# Patient Record
Sex: FEMALE | Race: WHITE | NOT HISPANIC OR LATINO | Employment: UNEMPLOYED | URBAN - METROPOLITAN AREA
[De-identification: names, ages, dates, MRNs, and addresses within clinical notes are randomized per-mention and may not be internally consistent; named-entity substitution may affect disease eponyms.]

---

## 2017-09-26 ENCOUNTER — HOSPITAL ENCOUNTER (EMERGENCY)
Facility: MEDICAL CENTER | Age: 37
End: 2017-09-27
Attending: EMERGENCY MEDICINE
Payer: MEDICAID

## 2017-09-26 ENCOUNTER — APPOINTMENT (OUTPATIENT)
Dept: RADIOLOGY | Facility: MEDICAL CENTER | Age: 37
End: 2017-09-26
Attending: EMERGENCY MEDICINE
Payer: MEDICAID

## 2017-09-26 DIAGNOSIS — Z33.2: ICD-10-CM

## 2017-09-26 DIAGNOSIS — R10.2 PELVIC PAIN: ICD-10-CM

## 2017-09-26 LAB
BASOPHILS # BLD AUTO: 0.3 % (ref 0–1.8)
BASOPHILS # BLD: 0.05 K/UL (ref 0–0.12)
EOSINOPHIL # BLD AUTO: 0.33 K/UL (ref 0–0.51)
EOSINOPHIL NFR BLD: 1.9 % (ref 0–6.9)
ERYTHROCYTE [DISTWIDTH] IN BLOOD BY AUTOMATED COUNT: 42.5 FL (ref 35.9–50)
HCT VFR BLD AUTO: 34.5 % (ref 37–47)
HGB BLD-MCNC: 11.9 G/DL (ref 12–16)
IMM GRANULOCYTES # BLD AUTO: 0.1 K/UL (ref 0–0.11)
IMM GRANULOCYTES NFR BLD AUTO: 0.6 % (ref 0–0.9)
LYMPHOCYTES # BLD AUTO: 1.8 K/UL (ref 1–4.8)
LYMPHOCYTES NFR BLD: 10.3 % (ref 22–41)
MCH RBC QN AUTO: 31.7 PG (ref 27–33)
MCHC RBC AUTO-ENTMCNC: 34.5 G/DL (ref 33.6–35)
MCV RBC AUTO: 92 FL (ref 81.4–97.8)
MONOCYTES # BLD AUTO: 0.92 K/UL (ref 0–0.85)
MONOCYTES NFR BLD AUTO: 5.2 % (ref 0–13.4)
NEUTROPHILS # BLD AUTO: 14.36 K/UL (ref 2–7.15)
NEUTROPHILS NFR BLD: 81.7 % (ref 44–72)
NRBC # BLD AUTO: 0 K/UL
NRBC BLD AUTO-RTO: 0 /100 WBC
PLATELET # BLD AUTO: 292 K/UL (ref 164–446)
PMV BLD AUTO: 9.1 FL (ref 9–12.9)
RBC # BLD AUTO: 3.75 M/UL (ref 4.2–5.4)
WBC # BLD AUTO: 17.6 K/UL (ref 4.8–10.8)

## 2017-09-26 PROCEDURE — 700111 HCHG RX REV CODE 636 W/ 250 OVERRIDE (IP): Performed by: EMERGENCY MEDICINE

## 2017-09-26 PROCEDURE — 85025 COMPLETE CBC W/AUTO DIFF WBC: CPT

## 2017-09-26 PROCEDURE — 96375 TX/PRO/DX INJ NEW DRUG ADDON: CPT

## 2017-09-26 PROCEDURE — 36415 COLL VENOUS BLD VENIPUNCTURE: CPT

## 2017-09-26 PROCEDURE — 96374 THER/PROPH/DIAG INJ IV PUSH: CPT

## 2017-09-26 PROCEDURE — 99285 EMERGENCY DEPT VISIT HI MDM: CPT

## 2017-09-26 PROCEDURE — 76856 US EXAM PELVIC COMPLETE: CPT

## 2017-09-26 RX ORDER — OXYCODONE AND ACETAMINOPHEN 10; 325 MG/1; MG/1
1-2 TABLET ORAL EVERY 6 HOURS PRN
Qty: 8 TAB | Refills: 0 | Status: SHIPPED | OUTPATIENT
Start: 2017-09-26 | End: 2018-06-05

## 2017-09-26 RX ORDER — KETOROLAC TROMETHAMINE 30 MG/ML
30 INJECTION, SOLUTION INTRAMUSCULAR; INTRAVENOUS ONCE
Status: COMPLETED | OUTPATIENT
Start: 2017-09-26 | End: 2017-09-26

## 2017-09-26 RX ADMIN — HYDROMORPHONE HYDROCHLORIDE 1 MG: 1 INJECTION, SOLUTION INTRAMUSCULAR; INTRAVENOUS; SUBCUTANEOUS at 22:03

## 2017-09-26 RX ADMIN — KETOROLAC TROMETHAMINE 30 MG: 30 INJECTION, SOLUTION INTRAMUSCULAR at 22:03

## 2017-09-26 ASSESSMENT — PAIN SCALES - GENERAL: PAINLEVEL_OUTOF10: 6

## 2017-09-27 VITALS
TEMPERATURE: 98.7 F | SYSTOLIC BLOOD PRESSURE: 159 MMHG | BODY MASS INDEX: 38.9 KG/M2 | WEIGHT: 233.5 LBS | DIASTOLIC BLOOD PRESSURE: 95 MMHG | HEART RATE: 89 BPM | RESPIRATION RATE: 20 BRPM | HEIGHT: 65 IN | OXYGEN SATURATION: 100 %

## 2017-09-27 NOTE — ED NOTES
Discharge information provided. Pt verbalized understanding of discharge instructions to follow up with OBGYN, PCP and to return to ER if condition worsens. Pt expressed the awareness of not driving or operating heavy machinery. Pt ambulated out of ER in a steady gait. Patient reports that she has a ride home.

## 2017-09-27 NOTE — ED PROVIDER NOTES
"ED Provider Note    CHIEF COMPLAINT   Chief Complaint   Patient presents with   • Post-Op Complications       HPI   Marva Tucker is a 36 y.o. female who presents for severe low abdominal pain onset approximately 2-3 hours ago. Patient had a D&E today electively at [facility] in [place] for 11 week IUP. No complications at the time. Her bleeding's been normal since the procedure. No fever. Denies nausea and vomiting. No urinary symptoms.    REVIEW OF SYSTEMS  Pertinent positives include: Severe pelvic pain.  Pertinent negatives include: Fever, vomiting, dizziness, dysuria, urgency, frequency  10+ systems reviewed and negative.     PAST MEDICAL HISTORY  Past Medical History:   Diagnosis Date   • Angina    • Bone spur    • Depression    • Hypertension        SOCIAL HISTORY  Social History   Substance Use Topics   • Smoking status: Current Every Day Smoker     Packs/day: 0.50     Years: 18.00     Types: Cigarettes   • Smokeless tobacco: Never Used      Comment: 5 cigs/day   • Alcohol use Yes      Comment: rare     .    SURGICAL HISTORY  Past Surgical History:   Procedure Laterality Date   • GYN SURGERY         • TONSILLECTOMY         CURRENT MEDICATIONS  See Epic      ALLERGIES  Allergies   Allergen Reactions   • Morphine Vomiting       PHYSICAL EXAM  VITAL SIGNS: /95   Pulse (!) 108   Temp 37.1 °C (98.7 °F)   Resp 20   Ht 1.651 m (5' 5\")   Wt 105.9 kg (233 lb 8 oz)   SpO2 100%   BMI 38.86 kg/m²   Constitutional: Well developed, Well nourishedHypertensive, tachycardic, moderately overweight, moaning in pain.   HENT: Normocephalic, Atraumatic, Oropharynx moist.  Eyes: PERRLA, Conjunctiva pink.   Cardiovascular: Regular rate and rhythm, No murmurs, No rubs, No gallops.   Respiratory: Normal breath sounds, No respiratory distress, No wheezing.   Gastrointestinal: Soft, mild suprapubic tenderness without rebound guarding or distention..  Genitourinary: No flank tenderness.    Skin: Warm, " Dry, No erythema, No rash.   Back: No tenderness.   Neurologic: Alert & oriented x 3, Moves all extremities with strength.  Psychiatric: Affect normal, Judgment normal, Mood normal.       RADIOLOGY/PROCEDURES:  US-PELVIC TRANSABDOMINAL   Final Result      1.  Prominent heterogeneous uterus, with large fundal fibroid.   2.  Endometrium is difficult to evaluate however no focal hypervascular areas are present to indicate retained products of conception.   3.  Ovaries are not visualized.          LABORATORY:  Results for orders placed or performed during the hospital encounter of 17   CBC WITH DIFFERENTIAL   Result Value Ref Range    WBC 17.6 (H) 4.8 - 10.8 K/uL    RBC 3.75 (L) 4.20 - 5.40 M/uL    Hemoglobin 11.9 (L) 12.0 - 16.0 g/dL    Hematocrit 34.5 (L) 37.0 - 47.0 %    MCV 92.0 81.4 - 97.8 fL    MCH 31.7 27.0 - 33.0 pg    MCHC 34.5 33.6 - 35.0 g/dL    RDW 42.5 35.9 - 50.0 fL    Platelet Count 292 164 - 446 K/uL    MPV 9.1 9.0 - 12.9 fL    Neutrophils-Polys 81.70 (H) 44.00 - 72.00 %    Lymphocytes 10.30 (L) 22.00 - 41.00 %    Monocytes 5.20 0.00 - 13.40 %    Eosinophils 1.90 0.00 - 6.90 %    Basophils 0.30 0.00 - 1.80 %    Immature Granulocytes 0.60 0.00 - 0.90 %    Nucleated RBC 0.00 /100 WBC    Neutrophils (Absolute) 14.36 (H) 2.00 - 7.15 K/uL    Lymphs (Absolute) 1.80 1.00 - 4.80 K/uL    Monos (Absolute) 0.92 (H) 0.00 - 0.85 K/uL    Eos (Absolute) 0.33 0.00 - 0.51 K/uL    Baso (Absolute) 0.05 0.00 - 0.12 K/uL    Immature Granulocytes (abs) 0.10 0.00 - 0.11 K/uL    NRBC (Absolute) 0.00 K/uL       INTERVENTIONS:  Medications   HYDROmorphone (DILAUDID) injection 1 mg (not administered)   ketorolac (TORADOL) injection 30 mg (not administered)     Response:Good improvement in pain and almost nontender on repeat exam.    COURSE & MEDICAL DECISION MAKING;  Well-appearing patient presents with severe pelvic pain after a therapeutic  today. Her white count is elevated but her ultrasound is reassuring with no  evidence of retained products and no evidence of pelvic free fluid. Endometritis is unlikely. There is no evidence of perforation. Patient does have a large uterine fibroid which may have contributed to her severe postprocedure pain..    PLAN:  Continue ibuprofen 800 4 times a day as prescribed  Percocet 5 mg #8 for severe breakthrough pain  Prescription monitoring accessed. Risk of addiction discussed with the patient  Pelvic pain handout  Follow-up Dr. Guillaume GYN 1-2 days as needed  Return for severe pain, heavy bleeding or pain and fever    CONDITION: Stable, improved.    FINAL IMPRESSION:  1. Pelvic pain    2. Therapeutic  in first trimester          Electronically signed by: Husam Hanson, 2017 9:57 PM

## 2017-09-27 NOTE — ED NOTES
Pt bib family with c/o of inability to walk. Pt states she had an  in [place], [place] earlier today. Approximately 3 hours after the surgery she states she was unable to walk. Reports when she stands up she has intense pain.

## 2017-09-27 NOTE — DISCHARGE INSTRUCTIONS
Abdominal Pain, Women  Take ibuprofen 800 mg 4 times a day for 1-2 days and then Tylenol for mild pain or Percocet for more severe pain. Return to the ER for pain and fever, severe uncontrolled pain, dizziness, heavy bleeding or ill appearance. Follow-up with Dr. Guillaume as planned.     Abdominal (stomach, pelvic, or belly) pain can be caused by many things. It is important to tell your doctor:  · The location of the pain.  · Does it come and go or is it present all the time?  · Are there things that start the pain (eating certain foods, exercise)?  · Are there other symptoms associated with the pain (fever, nausea, vomiting, diarrhea)?  All of this is helpful to know when trying to find the cause of the pain.  CAUSES   · Stomach: virus or bacteria infection, or ulcer.  · Intestine: appendicitis (inflamed appendix), regional ileitis (Crohn's disease), ulcerative colitis (inflamed colon), irritable bowel syndrome, diverticulitis (inflamed diverticulum of the colon), or cancer of the stomach or intestine.  · Gallbladder disease or stones in the gallbladder.  · Kidney disease, kidney stones, or infection.  · Pancreas infection or cancer.  · Fibromyalgia (pain disorder).  · Diseases of the female organs:  · Uterus: fibroid (non-cancerous) tumors or infection.  · Fallopian tubes: infection or tubal pregnancy.  · Ovary: cysts or tumors.  · Pelvic adhesions (scar tissue).  · Endometriosis (uterus lining tissue growing in the pelvis and on the pelvic organs).  · Pelvic congestion syndrome (female organs filling up with blood just before the menstrual period).  · Pain with the menstrual period.  · Pain with ovulation (producing an egg).  · Pain with an IUD (intrauterine device, birth control) in the uterus.  · Cancer of the female organs.  · Functional pain (pain not caused by a disease, may improve without treatment).  · Psychological pain.  · Depression.  DIAGNOSIS   Your doctor will decide the seriousness of your pain by  doing an examination.  · Blood tests.  · X-rays.  · Ultrasound.  · CT scan (computed tomography, special type of X-ray).  · MRI (magnetic resonance imaging).  · Cultures, for infection.  · Barium enema (dye inserted in the large intestine, to better view it with X-rays).  · Colonoscopy (looking in intestine with a lighted tube).  · Laparoscopy (minor surgery, looking in abdomen with a lighted tube).  · Major abdominal exploratory surgery (looking in abdomen with a large incision).  TREATMENT   The treatment will depend on the cause of the pain.   · Many cases can be observed and treated at home.  · Over-the-counter medicines recommended by your caregiver.  · Prescription medicine.  · Antibiotics, for infection.  · Birth control pills, for painful periods or for ovulation pain.  · Hormone treatment, for endometriosis.  · Nerve blocking injections.  · Physical therapy.  · Antidepressants.  · Counseling with a psychologist or psychiatrist.  · Minor or major surgery.  HOME CARE INSTRUCTIONS   · Do not take laxatives, unless directed by your caregiver.  · Take over-the-counter pain medicine only if ordered by your caregiver. Do not take aspirin because it can cause an upset stomach or bleeding.  · Try a clear liquid diet (broth or water) as ordered by your caregiver. Slowly move to a bland diet, as tolerated, if the pain is related to the stomach or intestine.  · Have a thermometer and take your temperature several times a day, and record it.  · Bed rest and sleep, if it helps the pain.  · Avoid sexual intercourse, if it causes pain.  · Avoid stressful situations.  · Keep your follow-up appointments and tests, as your caregiver orders.  · If the pain does not go away with medicine or surgery, you may try:  · Acupuncture.  · Relaxation exercises (yoga, meditation).  · Group therapy.  · Counseling.  SEEK MEDICAL CARE IF:   · You notice certain foods cause stomach pain.  · Your home care treatment is not helping your  pain.  · You need stronger pain medicine.  · You want your IUD removed.  · You feel faint or lightheaded.  · You develop nausea and vomiting.  · You develop a rash.  · You are having side effects or an allergy to your medicine.  SEEK IMMEDIATE MEDICAL CARE IF:   · Your pain does not go away or gets worse.  · You have a fever.  · Your pain is felt only in portions of the abdomen. The right side could possibly be appendicitis. The left lower portion of the abdomen could be colitis or diverticulitis.  · You are passing blood in your stools (bright red or black tarry stools, with or without vomiting).  · You have blood in your urine.  · You develop chills, with or without a fever.  · You pass out.  MAKE SURE YOU:   · Understand these instructions.  · Will watch your condition.  · Will get help right away if you are not doing well or get worse.  Document Released: 10/14/2008 Document Revised: 03/11/2013 Document Reviewed: 11/04/2010  [employer]® Patient Information ©2014 [employer].

## 2017-11-07 ENCOUNTER — HOSPITAL ENCOUNTER (EMERGENCY)
Facility: MEDICAL CENTER | Age: 37
End: 2017-11-07
Attending: EMERGENCY MEDICINE
Payer: MEDICAID

## 2017-11-07 VITALS
RESPIRATION RATE: 16 BRPM | DIASTOLIC BLOOD PRESSURE: 104 MMHG | HEIGHT: 65 IN | SYSTOLIC BLOOD PRESSURE: 200 MMHG | WEIGHT: 233.25 LBS | HEART RATE: 98 BPM | TEMPERATURE: 97.7 F | OXYGEN SATURATION: 100 % | BODY MASS INDEX: 38.86 KG/M2

## 2017-11-07 DIAGNOSIS — Z72.0 TOBACCO USE: ICD-10-CM

## 2017-11-07 DIAGNOSIS — K02.9 DENTAL CARIES: ICD-10-CM

## 2017-11-07 DIAGNOSIS — K08.89 DENTALGIA: ICD-10-CM

## 2017-11-07 PROCEDURE — 96372 THER/PROPH/DIAG INJ SC/IM: CPT

## 2017-11-07 PROCEDURE — 99284 EMERGENCY DEPT VISIT MOD MDM: CPT

## 2017-11-07 PROCEDURE — 700111 HCHG RX REV CODE 636 W/ 250 OVERRIDE (IP): Performed by: EMERGENCY MEDICINE

## 2017-11-07 RX ORDER — PENICILLIN V POTASSIUM 500 MG/1
500 TABLET ORAL EVERY 6 HOURS
Qty: 40 TAB | Refills: 0 | Status: SHIPPED | OUTPATIENT
Start: 2017-11-07 | End: 2018-06-05

## 2017-11-07 RX ORDER — NAPROXEN 500 MG/1
500 TABLET ORAL 2 TIMES DAILY WITH MEALS
Qty: 20 TAB | Refills: 0 | Status: SHIPPED | OUTPATIENT
Start: 2017-11-07 | End: 2018-06-05

## 2017-11-07 RX ORDER — KETOROLAC TROMETHAMINE 30 MG/ML
30 INJECTION, SOLUTION INTRAMUSCULAR; INTRAVENOUS ONCE
Status: COMPLETED | OUTPATIENT
Start: 2017-11-07 | End: 2017-11-07

## 2017-11-07 RX ADMIN — KETOROLAC TROMETHAMINE 30 MG: 30 INJECTION, SOLUTION INTRAMUSCULAR at 21:12

## 2017-11-07 ASSESSMENT — PAIN SCALES - GENERAL: PAINLEVEL_OUTOF10: 6

## 2017-11-08 NOTE — DISCHARGE INSTRUCTIONS
Dental Caries  Dental caries (also called tooth decay) is the most common oral disease. It can occur at any age but is more common in children and young adults.   HOW DENTAL CARIES DEVELOPS   The process of decay begins when bacteria and foods (particularly sugars and starches) combine in your mouth to produce plaque. Plaque is a substance that sticks to the hard, outer surface of a tooth (enamel). The bacteria in plaque produce acids that attack enamel. These acids may also attack the root surface of a tooth (cementum) if it is exposed. Repeated attacks dissolve these surfaces and create holes in the tooth (cavities). If left untreated, the acids destroy the other layers of the tooth.   RISK FACTORS  · Frequent sipping of sugary beverages.    · Frequent snacking on sugary and starchy foods, especially those that easily get stuck in the teeth.    · Poor oral hygiene.    · Dry mouth.    · Substance abuse such as methamphetamine abuse.    · Broken or poor-fitting dental restorations.    · Eating disorders.    · Gastroesophageal reflux disease (GERD).    · Certain radiation treatments to the head and neck.  SYMPTOMS  In the early stages of dental caries, symptoms are seldom present. Sometimes white, chalky areas may be seen on the enamel or other tooth layers. In later stages, symptoms may include:  · Pits and holes on the enamel.  · Toothache after sweet, hot, or cold foods or drinks are consumed.  · Pain around the tooth.  · Swelling around the tooth.  DIAGNOSIS   Most of the time, dental caries is detected during a regular dental checkup. A diagnosis is made after a thorough medical and dental history is taken and the surfaces of your teeth are checked for signs of dental caries. Sometimes special instruments, such as lasers, are used to check for dental caries. Dental X-ray exams may be taken so that areas not visible to the eye (such as between the contact areas of the teeth) can be checked for cavities.    TREATMENT   If dental caries is in its early stages, it may be reversed with a fluoride treatment or an application of a remineralizing agent at the dental office. Thorough brushing and flossing at home is needed to aid these treatments. If it is in its later stages, treatment depends on the location and extent of tooth destruction:   · If a small area of the tooth has been destroyed, the destroyed area will be removed and cavities will be filled with a material such as gold, silver amalgam, or composite resin.    · If a large area of the tooth has been destroyed, the destroyed area will be removed and a cap (crown) will be fitted over the remaining tooth structure.    · If the center part of the tooth (pulp) is affected, a procedure called a root canal will be needed before a filling or crown can be placed.    · If most of the tooth has been destroyed, the tooth may need to be pulled (extracted).  HOME CARE INSTRUCTIONS  You can prevent, stop, or reverse dental caries at home by practicing good oral hygiene. Good oral hygiene includes:  · Thoroughly cleaning your teeth at least twice a day with a toothbrush and dental floss.    · Using a fluoride toothpaste. A fluoride mouth rinse may also be used if recommended by your dentist or health care provider.    · Restricting the amount of sugary and starchy foods and sugary liquids you consume.    · Avoiding frequent snacking on these foods and sipping of these liquids.    · Keeping regular visits with a dentist for checkups and cleanings.  PREVENTION   · Practice good oral hygiene.  · Consider a dental sealant. A dental sealant is a coating material that is applied by your dentist to the pits and grooves of teeth. The sealant prevents food from being trapped in them. It may protect the teeth for several years.  · Ask about fluoride supplements if you live in a community without fluorinated water or with water that has a low fluoride content. Use fluoride supplements  as directed by your dentist or health care provider.  · Allow fluoride varnish applications to teeth if directed by your dentist or health care provider.     This information is not intended to replace advice given to you by your health care provider. Make sure you discuss any questions you have with your health care provider.     Document Released: 09/09/2003 Document Revised: 01/08/2016 Document Reviewed: 12/20/2013  [employer] Interactive Patient Education ©2016 [employer]

## 2017-11-08 NOTE — ED PROVIDER NOTES
"ED Provider Note    CHIEF COMPLAINT  Chief Complaint   Patient presents with   • Dental Pain       HPI  Marva Tucker is a 36 y.o. female who presents Left mandibular dental pain that began 3 days ago, many presentations in the past for the same, states she was going to make an appointment with her dentist however she took a Tylenol PM last night which made her too sleepy throughout the day today to actually make a phone call for an appointment. She plans on calling in the morning. She states she took a couple penicillin today that she had left over from a prior infection. No fever, no vomiting.    REVIEW OF SYSTEMS  Negative for fever, vomiting.    PAST MEDICAL HISTORY   has a past medical history of Angina; Bone spur; Depression; and Hypertension.    SOCIAL HISTORY  Social History     Social History Main Topics   • Smoking status: Current Every Day Smoker     Packs/day: 0.50     Years: 18.00     Types: Cigarettes   • Smokeless tobacco: Never Used      Comment: 5 cigs/day   • Alcohol use Yes      Comment: rare   • Drug use: No   • Sexual activity: Not on file       SURGICAL HISTORY   has a past surgical history that includes tonsillectomy and gyn surgery.    CURRENT MEDICATIONS  I personally reviewed the medication list in the charting documentation.     ALLERGIES  No Known Allergies    PHYSICAL EXAM  VITAL SIGNS: BP (!) 168/112   Pulse 85   Temp 36.7 °C (98 °F)   Resp 18   Ht 1.651 m (5' 5\")   Wt 105.8 kg (233 lb 4 oz)   SpO2 100%   BMI 38.81 kg/m²   Constitutional:Tearful  HENT: Multiple missing in decayed teeth, the painful tooth is the 1st premolar, left mandibular, significant decay but no surrounding swelling or drainage  Eyes: Conjunctiva normal, Non-icteric.   Chest: Normal nonlabored respirations  Skin: No erythema, No rash.   Musculoskeletal: Good range of motion in all major joints.   Neurologic: Alert, No focal deficits noted.   Psychiatric: Affect normal, Judgment normal.    COURSE & MEDICAL " DECISION MAKING  Pertinent Labs & Imaging studies reviewed. (See chart for details)    Encounter Summary: This is a 36 y.o. female with 3 days of dental pain, no drainable abscess on exam. Many presentations in the past for the same. She failed to contact her dentist over the past 3 days and states that she was too tired today to call the dentist. I will not treat her with any sort of opiate-based pain medication or prescribe her any, will treat her penicillin and naproxen, an injection of Toradol here in the emergency department and she is stable and appropriate to be discharged.      DISPOSITION: Discharge Home      FINAL IMPRESSION  1. Dentalgia    2. Dental caries    3. Tobacco use        This dictation was created using voice recognition software. The accuracy of the dictation is limited to the abilities of the software. I expect there may be some errors of grammar and possibly content. The nursing notes were reviewed and certain aspects of this information were incorporated into this note.    Electronically signed by: Demar Skinner, 11/7/2017 9:04 PM

## 2018-03-09 ENCOUNTER — HOSPITAL ENCOUNTER (EMERGENCY)
Facility: MEDICAL CENTER | Age: 38
End: 2018-03-09
Attending: EMERGENCY MEDICINE
Payer: MEDICAID

## 2018-03-09 VITALS
BODY MASS INDEX: 38.05 KG/M2 | HEIGHT: 65 IN | RESPIRATION RATE: 18 BRPM | DIASTOLIC BLOOD PRESSURE: 105 MMHG | OXYGEN SATURATION: 99 % | WEIGHT: 228.4 LBS | SYSTOLIC BLOOD PRESSURE: 152 MMHG | HEART RATE: 103 BPM | TEMPERATURE: 98.6 F

## 2018-03-09 PROCEDURE — 302449 STATCHG TRIAGE ONLY (STATISTIC)

## 2018-03-09 ASSESSMENT — PAIN SCALES - GENERAL: PAINLEVEL_OUTOF10: 6

## 2018-03-10 NOTE — ED NOTES
"Chief Complaint   Patient presents with   • Ear Pain     Left side, started this am. Denies injury. Pt states she also has a cough that started last night. Denies fever     /105   Pulse (!) 103   Temp 37 °C (98.6 °F)   Resp 18   Ht 1.651 m (5' 5\")   Wt 103.6 kg (228 lb 6.3 oz)   SpO2 99%   BMI 38.01 kg/m²     "

## 2018-06-05 ENCOUNTER — HOSPITAL ENCOUNTER (EMERGENCY)
Facility: MEDICAL CENTER | Age: 38
End: 2018-06-05
Attending: EMERGENCY MEDICINE
Payer: MEDICAID

## 2018-06-05 ENCOUNTER — APPOINTMENT (OUTPATIENT)
Dept: RADIOLOGY | Facility: MEDICAL CENTER | Age: 38
End: 2018-06-05
Attending: EMERGENCY MEDICINE
Payer: MEDICAID

## 2018-06-05 VITALS
HEART RATE: 78 BPM | WEIGHT: 235.89 LBS | BODY MASS INDEX: 39.3 KG/M2 | HEIGHT: 65 IN | SYSTOLIC BLOOD PRESSURE: 147 MMHG | DIASTOLIC BLOOD PRESSURE: 101 MMHG | RESPIRATION RATE: 16 BRPM | OXYGEN SATURATION: 99 %

## 2018-06-05 DIAGNOSIS — R07.81 PLEURITIC CHEST PAIN: ICD-10-CM

## 2018-06-05 LAB
ALBUMIN SERPL BCP-MCNC: 3.4 G/DL (ref 3.2–4.9)
ALBUMIN/GLOB SERPL: 1.1 G/DL
ALP SERPL-CCNC: 49 U/L (ref 30–99)
ALT SERPL-CCNC: 12 U/L (ref 2–50)
ANION GAP SERPL CALC-SCNC: 5 MMOL/L (ref 0–11.9)
APTT PPP: 26.4 SEC (ref 24.7–36)
AST SERPL-CCNC: 18 U/L (ref 12–45)
BASOPHILS # BLD AUTO: 0.5 % (ref 0–1.8)
BASOPHILS # BLD: 0.05 K/UL (ref 0–0.12)
BILIRUB SERPL-MCNC: 0.3 MG/DL (ref 0.1–1.5)
BNP SERPL-MCNC: 75 PG/ML (ref 0–100)
BUN SERPL-MCNC: 11 MG/DL (ref 8–22)
CALCIUM SERPL-MCNC: 8.7 MG/DL (ref 8.4–10.2)
CHLORIDE SERPL-SCNC: 108 MMOL/L (ref 96–112)
CO2 SERPL-SCNC: 24 MMOL/L (ref 20–33)
CREAT SERPL-MCNC: 0.58 MG/DL (ref 0.5–1.4)
DEPRECATED D DIMER PPP IA-ACNC: 276 NG/ML(D-DU)
EKG IMPRESSION: NORMAL
EOSINOPHIL # BLD AUTO: 0.59 K/UL (ref 0–0.51)
EOSINOPHIL NFR BLD: 6.5 % (ref 0–6.9)
ERYTHROCYTE [DISTWIDTH] IN BLOOD BY AUTOMATED COUNT: 42.6 FL (ref 35.9–50)
GLOBULIN SER CALC-MCNC: 3 G/DL (ref 1.9–3.5)
GLUCOSE SERPL-MCNC: 80 MG/DL (ref 65–99)
HCT VFR BLD AUTO: 39.7 % (ref 37–47)
HGB BLD-MCNC: 13.1 G/DL (ref 12–16)
IMM GRANULOCYTES # BLD AUTO: 0.02 K/UL (ref 0–0.11)
IMM GRANULOCYTES NFR BLD AUTO: 0.2 % (ref 0–0.9)
INR PPP: 0.91 (ref 0.87–1.13)
LIPASE SERPL-CCNC: 49 U/L (ref 7–58)
LYMPHOCYTES # BLD AUTO: 1.9 K/UL (ref 1–4.8)
LYMPHOCYTES NFR BLD: 20.9 % (ref 22–41)
MCH RBC QN AUTO: 30 PG (ref 27–33)
MCHC RBC AUTO-ENTMCNC: 33 G/DL (ref 33.6–35)
MCV RBC AUTO: 90.8 FL (ref 81.4–97.8)
MONOCYTES # BLD AUTO: 0.86 K/UL (ref 0–0.85)
MONOCYTES NFR BLD AUTO: 9.5 % (ref 0–13.4)
NEUTROPHILS # BLD AUTO: 5.68 K/UL (ref 2–7.15)
NEUTROPHILS NFR BLD: 62.4 % (ref 44–72)
NRBC # BLD AUTO: 0 K/UL
NRBC BLD-RTO: 0 /100 WBC
PLATELET # BLD AUTO: 303 K/UL (ref 164–446)
PMV BLD AUTO: 9.3 FL (ref 9–12.9)
POTASSIUM SERPL-SCNC: 3.4 MMOL/L (ref 3.6–5.5)
PROT SERPL-MCNC: 6.4 G/DL (ref 6–8.2)
PROTHROMBIN TIME: 12.2 SEC (ref 12–14.6)
RBC # BLD AUTO: 4.37 M/UL (ref 4.2–5.4)
SODIUM SERPL-SCNC: 137 MMOL/L (ref 135–145)
TROPONIN I SERPL-MCNC: <0.02 NG/ML (ref 0–0.04)
WBC # BLD AUTO: 9.1 K/UL (ref 4.8–10.8)

## 2018-06-05 PROCEDURE — 700111 HCHG RX REV CODE 636 W/ 250 OVERRIDE (IP): Performed by: EMERGENCY MEDICINE

## 2018-06-05 PROCEDURE — 71275 CT ANGIOGRAPHY CHEST: CPT

## 2018-06-05 PROCEDURE — 85730 THROMBOPLASTIN TIME PARTIAL: CPT

## 2018-06-05 PROCEDURE — 80053 COMPREHEN METABOLIC PANEL: CPT

## 2018-06-05 PROCEDURE — 96375 TX/PRO/DX INJ NEW DRUG ADDON: CPT

## 2018-06-05 PROCEDURE — 36415 COLL VENOUS BLD VENIPUNCTURE: CPT

## 2018-06-05 PROCEDURE — 94760 N-INVAS EAR/PLS OXIMETRY 1: CPT

## 2018-06-05 PROCEDURE — 85379 FIBRIN DEGRADATION QUANT: CPT

## 2018-06-05 PROCEDURE — 83880 ASSAY OF NATRIURETIC PEPTIDE: CPT

## 2018-06-05 PROCEDURE — 96374 THER/PROPH/DIAG INJ IV PUSH: CPT

## 2018-06-05 PROCEDURE — 93005 ELECTROCARDIOGRAM TRACING: CPT | Performed by: EMERGENCY MEDICINE

## 2018-06-05 PROCEDURE — 93005 ELECTROCARDIOGRAM TRACING: CPT

## 2018-06-05 PROCEDURE — 700117 HCHG RX CONTRAST REV CODE 255: Performed by: EMERGENCY MEDICINE

## 2018-06-05 PROCEDURE — 84484 ASSAY OF TROPONIN QUANT: CPT

## 2018-06-05 PROCEDURE — 99285 EMERGENCY DEPT VISIT HI MDM: CPT

## 2018-06-05 PROCEDURE — 85610 PROTHROMBIN TIME: CPT

## 2018-06-05 PROCEDURE — 83690 ASSAY OF LIPASE: CPT

## 2018-06-05 PROCEDURE — 85025 COMPLETE CBC W/AUTO DIFF WBC: CPT

## 2018-06-05 PROCEDURE — 71045 X-RAY EXAM CHEST 1 VIEW: CPT

## 2018-06-05 RX ORDER — KETOROLAC TROMETHAMINE 30 MG/ML
30 INJECTION, SOLUTION INTRAMUSCULAR; INTRAVENOUS ONCE
Status: COMPLETED | OUTPATIENT
Start: 2018-06-05 | End: 2018-06-05

## 2018-06-05 RX ADMIN — IOHEXOL 75 ML: 350 INJECTION, SOLUTION INTRAVENOUS at 18:33

## 2018-06-05 RX ADMIN — FENTANYL CITRATE 100 MCG: 50 INJECTION INTRAMUSCULAR; INTRAVENOUS at 16:49

## 2018-06-05 RX ADMIN — KETOROLAC TROMETHAMINE 30 MG: 30 INJECTION, SOLUTION INTRAMUSCULAR at 15:56

## 2018-06-05 ASSESSMENT — PAIN SCALES - GENERAL
PAINLEVEL_OUTOF10: 3
PAINLEVEL_OUTOF10: 6

## 2018-06-05 NOTE — ED PROVIDER NOTES
"ED Provider Note    CHIEF COMPLAINT  Chief Complaint   Patient presents with   • Shortness of Breath   • Chest Pain       HPI  Marva Tucker is a 37 y.o. female who ambulates to the emergency department with her significant other for chest pain.  Patient describes her right-sided chest pain, under her right breast, worse with movement of her upper extremity or deep inspiration.  Pain began yesterday but significantly worse today.  Patient denies trauma, injury or new strain pattern.  Denies new cough, chronic \"hacking cough\" due to tobacco use, but denies recent illness, congestion or sputum production.  Denies fever or chills.  Denies palpitations or syncope.  Patient believes she feels short of breath only because she is not taking deep breaths because of the discomfort.  No abdominal pain.  No nausea or vomiting.  No unilateral extremity swelling.  No history for similar symptoms.    No personal or family history for blood clot.  Patient has known hypertension, noncompliant with medications.  No family history for early MI or sudden death.    REVIEW OF SYSTEMS  See HPI for further details. All other systems are negative.     PAST MEDICAL HISTORY   has a past medical history of Angina; Bone spur; Depression; and Hypertension.    SOCIAL HISTORY  Social History     Social History Main Topics   • Smoking status: Current Every Day Smoker     Packs/day: 0.50     Years: 18.00     Types: Cigarettes   • Smokeless tobacco: Never Used      Comment: 5 cigs/day   • Alcohol use Yes      Comment: rare   • Drug use: No   • Sexual activity: Not on file       SURGICAL HISTORY   has a past surgical history that includes tonsillectomy and gyn surgery.    CURRENT MEDICATIONS  Home Medications     Reviewed by Melisa Conway (Pharmacy Tech) on 06/05/18 at 1604  Med List Status: Complete   Medication Last Dose Status        Patient Tomas Taking any Medications                       ALLERGIES  No Known Allergies    PHYSICAL " "EXAM  VITAL SIGNS: /101   Pulse 78   Resp 16   Ht 1.651 m (5' 5\")   Wt 107 kg (235 lb 14.3 oz)   SpO2 99%   BMI 39.25 kg/m²   Pulse ox interpretation: I interpret this pulse ox as normal.  Constitutional: Alert in no apparent distress.  HENT: Normocephalic, atraumatic. Bilateral external ears normal, Nose normal. Moist mucous membranes.    Eyes: Pupils are equal and reactive, Conjunctiva normal.   Neck: Normal range of motion, Supple  Lymphatic: No lymphadenopathy noted.  No axillary or supraclavicular lymphadenopathy.  Cardiovascular: Regular rate and rhythm, no murmurs. Distal pulses intact.  No peripheral edema.  No unilateral extremity edema or swelling.  Thorax & Lungs: Normal breath sounds.  No wheezing/rales/ronchi. No increased work of breathing, clipped speech or retractions.  Patient splinting with deep inspiration due to discomfort.  Abdomen: Soft, non-distended, non-tender to palpation. No palpable or pulsatile masses. No peritoneal signs. No  Skin: Warm, Dry, No erythema, No rash.   Musculoskeletal: Good range of motion in all major joints.   Neurologic: Alert and ambulates independently.  Psychiatric: Affect normal, Judgment normal, Mood normal.       DIAGNOSTIC STUDIES / PROCEDURES    EKG  I interpreted this EKG myself.  This is a 12-lead study.  The rhythm is sinus with a rate of 79.  There are no ST segment nor T wave abnormalities.  Normal intervals.  Interpretation: No ST segment elevation myocardial infarction.  No ectopy.  No arrhythmia.  No previous for comparison.    LABS  Results for orders placed or performed during the hospital encounter of 06/05/18   Btype Natriuretic Peptide   Result Value Ref Range    B Natriuretic Peptide 75 0 - 100 pg/mL   CBC with Differential   Result Value Ref Range    WBC 9.1 4.8 - 10.8 K/uL    RBC 4.37 4.20 - 5.40 M/uL    Hemoglobin 13.1 12.0 - 16.0 g/dL    Hematocrit 39.7 37.0 - 47.0 %    MCV 90.8 81.4 - 97.8 fL    MCH 30.0 27.0 - 33.0 pg    MCHC 33.0 " (L) 33.6 - 35.0 g/dL    RDW 42.6 35.9 - 50.0 fL    Platelet Count 303 164 - 446 K/uL    MPV 9.3 9.0 - 12.9 fL    Neutrophils-Polys 62.40 44.00 - 72.00 %    Lymphocytes 20.90 (L) 22.00 - 41.00 %    Monocytes 9.50 0.00 - 13.40 %    Eosinophils 6.50 0.00 - 6.90 %    Basophils 0.50 0.00 - 1.80 %    Immature Granulocytes 0.20 0.00 - 0.90 %    Nucleated RBC 0.00 /100 WBC    Neutrophils (Absolute) 5.68 2.00 - 7.15 K/uL    Lymphs (Absolute) 1.90 1.00 - 4.80 K/uL    Monos (Absolute) 0.86 (H) 0.00 - 0.85 K/uL    Eos (Absolute) 0.59 (H) 0.00 - 0.51 K/uL    Baso (Absolute) 0.05 0.00 - 0.12 K/uL    Immature Granulocytes (abs) 0.02 0.00 - 0.11 K/uL    NRBC (Absolute) 0.00 K/uL   Complete Metabolic Panel (CMP)   Result Value Ref Range    Sodium 137 135 - 145 mmol/L    Potassium 3.4 (L) 3.6 - 5.5 mmol/L    Chloride 108 96 - 112 mmol/L    Co2 24 20 - 33 mmol/L    Anion Gap 5.0 0.0 - 11.9    Glucose 80 65 - 99 mg/dL    Bun 11 8 - 22 mg/dL    Creatinine 0.58 0.50 - 1.40 mg/dL    Calcium 8.7 8.4 - 10.2 mg/dL    AST(SGOT) 18 12 - 45 U/L    ALT(SGPT) 12 2 - 50 U/L    Alkaline Phosphatase 49 30 - 99 U/L    Total Bilirubin 0.3 0.1 - 1.5 mg/dL    Albumin 3.4 3.2 - 4.9 g/dL    Total Protein 6.4 6.0 - 8.2 g/dL    Globulin 3.0 1.9 - 3.5 g/dL    A-G Ratio 1.1 g/dL   Prothrombin Time   Result Value Ref Range    PT 12.2 12.0 - 14.6 sec    INR 0.91 0.87 - 1.13   APTT   Result Value Ref Range    APTT 26.4 24.7 - 36.0 sec   Lipase   Result Value Ref Range    Lipase 49 7 - 58 U/L   D-DIMER   Result Value Ref Range    D-Dimer Screen 276 (H) <250 ng/mL(D-DU)   ESTIMATED GFR   Result Value Ref Range    GFR If African American >60 >60 mL/min/1.73 m 2    GFR If Non African American >60 >60 mL/min/1.73 m 2   EKG (ER)   Result Value Ref Range    Report       [facility] Emergency Dept.    Test Date:  2018-06-05  Pt Name:    WU HELM                Department: EDS  MRN:        [id number]                      Room:       [facility]  7  Gender:     Female                       Technician: HRR  :        1980                   Requested By:ER TRIAGE PROTOCOL  Order #:    [id number]                    Reading MD: AL GLYNN DO    Measurements  Intervals                                Axis  Rate:       79                           P:          45  MS:         136                          QRS:        35  QRSD:       90                           T:          39  QT:         399  QTc:        458    Interpretive Statements  Sinus rhythm  No previous ECG available for comparison    Electronically Signed On 2018 15:59:38 PDT by AL GLYNN DO       RADIOLOGY  DX-CHEST-LIMITED (1 VIEW)   Final Result      Moderate cardiac silhouette enlargement could be secondary to chamber dilatation or a pericardial effusion      CT-CTA CHEST PULMONARY ARTERY W/ RECONS    (Results Pending)       COURSE & MEDICAL DECISION MAKING  Nursing notes and vital signs were reviewed. (See chart for details)  The patients records were reviewed, history was obtained from the patient;     ED evaluation for right-sided pleuritic chest pain still pending.  Elevated d-dimer, 276, otherwise labs are unremarkable.  No leukocytosis, left shift or bandemia.  No anemia.  No electrolyte derangement.  EKG within normal limits, no evidence for STEMI or other ischemia.  Continuous telemetry without ectopy or arrhythmia.  Vital signs are stable without fever tachycardia, patient is somewhat hypertensive.  Never hypoxic.  Clinically stable however pain persists after Toradol.  Fentanyl has been added as well.    Given pleuritic chest pain, although there is no clinical sequelae for DVT and patient is otherwise at low risk, CT of the chest with IV contrast been added to exclude pulmonary embolism.    4:48 PM patient will be signed out to my colleague, Dr. scott, for reevaluation and final disposition after imaging results.    FINAL IMPRESSION  (R07.81) Pleuritic chest  pain      Electronically signed by: Crissy Mack, 6/5/2018 3:54 PM      This dictation was created using voice recognition software. The accuracy of the dictation is limited to the abilities of the software. I expect there may be some errors of grammar and possibly content. The nursing notes were reviewed and certain aspects of this information were incorporated into this note.

## 2018-06-05 NOTE — ED NOTES
Amb to triage for eval of R-sided/sub sternal chest pain.  Started yesterday.  With R arm movement or deep breaths only.  Feels SOB.  No trauma.

## 2018-07-27 ENCOUNTER — HOSPITAL ENCOUNTER (OUTPATIENT)
Facility: MEDICAL CENTER | Age: 38
End: 2018-07-31
Attending: EMERGENCY MEDICINE | Admitting: INTERNAL MEDICINE
Payer: MEDICAID

## 2018-07-27 DIAGNOSIS — R94.31 QT PROLONGATION: ICD-10-CM

## 2018-07-27 DIAGNOSIS — F32.89 OTHER DEPRESSION: ICD-10-CM

## 2018-07-27 DIAGNOSIS — T50.902A INTENTIONAL DRUG OVERDOSE, INITIAL ENCOUNTER (HCC): ICD-10-CM

## 2018-07-27 PROBLEM — F10.10 ALCOHOL ABUSE: Status: ACTIVE | Noted: 2018-07-27

## 2018-07-27 PROBLEM — E87.6 HYPOKALEMIA: Status: ACTIVE | Noted: 2018-07-27

## 2018-07-27 PROBLEM — Z72.0 TOBACCO ABUSE: Status: ACTIVE | Noted: 2018-07-27

## 2018-07-27 LAB
ALBUMIN SERPL BCP-MCNC: 3.9 G/DL (ref 3.2–4.9)
ALBUMIN SERPL BCP-MCNC: 4.3 G/DL (ref 3.2–4.9)
ALBUMIN/GLOB SERPL: 1.5 G/DL
ALBUMIN/GLOB SERPL: 1.5 G/DL
ALP SERPL-CCNC: 50 U/L (ref 30–99)
ALP SERPL-CCNC: 54 U/L (ref 30–99)
ALT SERPL-CCNC: 10 U/L (ref 2–50)
ALT SERPL-CCNC: 8 U/L (ref 2–50)
ANION GAP SERPL CALC-SCNC: 11 MMOL/L (ref 0–11.9)
ANION GAP SERPL CALC-SCNC: 13 MMOL/L (ref 0–11.9)
APAP SERPL-MCNC: <10 UG/ML (ref 10–30)
APAP SERPL-MCNC: <10 UG/ML (ref 10–30)
APPEARANCE UR: CLEAR
AST SERPL-CCNC: 18 U/L (ref 12–45)
AST SERPL-CCNC: 19 U/L (ref 12–45)
BACTERIA #/AREA URNS HPF: ABNORMAL /HPF
BASOPHILS # BLD AUTO: 0.5 % (ref 0–1.8)
BASOPHILS # BLD: 0.05 K/UL (ref 0–0.12)
BILIRUB SERPL-MCNC: 0.5 MG/DL (ref 0.1–1.5)
BILIRUB SERPL-MCNC: 0.7 MG/DL (ref 0.1–1.5)
BILIRUB UR QL STRIP.AUTO: NEGATIVE
BUN SERPL-MCNC: 10 MG/DL (ref 8–22)
BUN SERPL-MCNC: 11 MG/DL (ref 8–22)
CALCIUM SERPL-MCNC: 8.7 MG/DL (ref 8.5–10.5)
CALCIUM SERPL-MCNC: 9.3 MG/DL (ref 8.5–10.5)
CHLORIDE SERPL-SCNC: 107 MMOL/L (ref 96–112)
CHLORIDE SERPL-SCNC: 109 MMOL/L (ref 96–112)
CO2 SERPL-SCNC: 19 MMOL/L (ref 20–33)
CO2 SERPL-SCNC: 20 MMOL/L (ref 20–33)
COLOR UR: YELLOW
CREAT SERPL-MCNC: 0.56 MG/DL (ref 0.5–1.4)
CREAT SERPL-MCNC: 0.65 MG/DL (ref 0.5–1.4)
EKG IMPRESSION: NORMAL
EKG IMPRESSION: NORMAL
EOSINOPHIL # BLD AUTO: 0.09 K/UL (ref 0–0.51)
EOSINOPHIL NFR BLD: 0.9 % (ref 0–6.9)
EPI CELLS #/AREA URNS HPF: ABNORMAL /HPF
ERYTHROCYTE [DISTWIDTH] IN BLOOD BY AUTOMATED COUNT: 41.8 FL (ref 35.9–50)
GLOBULIN SER CALC-MCNC: 2.6 G/DL (ref 1.9–3.5)
GLOBULIN SER CALC-MCNC: 2.8 G/DL (ref 1.9–3.5)
GLUCOSE SERPL-MCNC: 114 MG/DL (ref 65–99)
GLUCOSE SERPL-MCNC: 91 MG/DL (ref 65–99)
GLUCOSE UR STRIP.AUTO-MCNC: NEGATIVE MG/DL
HCG SERPL QL: NEGATIVE
HCT VFR BLD AUTO: 39.5 % (ref 37–47)
HGB BLD-MCNC: 13.6 G/DL (ref 12–16)
HYALINE CASTS #/AREA URNS LPF: ABNORMAL /LPF
IMM GRANULOCYTES # BLD AUTO: 0.02 K/UL (ref 0–0.11)
IMM GRANULOCYTES NFR BLD AUTO: 0.2 % (ref 0–0.9)
KETONES UR STRIP.AUTO-MCNC: 15 MG/DL
LEUKOCYTE ESTERASE UR QL STRIP.AUTO: NEGATIVE
LIPASE SERPL-CCNC: 31 U/L (ref 11–82)
LYMPHOCYTES # BLD AUTO: 2.23 K/UL (ref 1–4.8)
LYMPHOCYTES NFR BLD: 22.8 % (ref 22–41)
MCH RBC QN AUTO: 30.6 PG (ref 27–33)
MCHC RBC AUTO-ENTMCNC: 34.4 G/DL (ref 33.6–35)
MCV RBC AUTO: 89 FL (ref 81.4–97.8)
MICRO URNS: ABNORMAL
MONOCYTES # BLD AUTO: 0.8 K/UL (ref 0–0.85)
MONOCYTES NFR BLD AUTO: 8.2 % (ref 0–13.4)
NEUTROPHILS # BLD AUTO: 6.59 K/UL (ref 2–7.15)
NEUTROPHILS NFR BLD: 67.4 % (ref 44–72)
NITRITE UR QL STRIP.AUTO: NEGATIVE
NRBC # BLD AUTO: 0 K/UL
NRBC BLD-RTO: 0 /100 WBC
PH UR STRIP.AUTO: 5.5 [PH]
PLATELET # BLD AUTO: 349 K/UL (ref 164–446)
PMV BLD AUTO: 9.7 FL (ref 9–12.9)
POC BREATHALIZER: 0 PERCENT (ref 0–0.01)
POTASSIUM SERPL-SCNC: 3.2 MMOL/L (ref 3.6–5.5)
POTASSIUM SERPL-SCNC: 3.5 MMOL/L (ref 3.6–5.5)
PROT SERPL-MCNC: 6.5 G/DL (ref 6–8.2)
PROT SERPL-MCNC: 7.1 G/DL (ref 6–8.2)
PROT UR QL STRIP: NEGATIVE MG/DL
RBC # BLD AUTO: 4.44 M/UL (ref 4.2–5.4)
RBC # URNS HPF: ABNORMAL /HPF
RBC UR QL AUTO: ABNORMAL
SALICYLATES SERPL-MCNC: 0 MG/DL (ref 15–25)
SODIUM SERPL-SCNC: 138 MMOL/L (ref 135–145)
SODIUM SERPL-SCNC: 141 MMOL/L (ref 135–145)
SP GR UR STRIP.AUTO: 1.01
UROBILINOGEN UR STRIP.AUTO-MCNC: 0.2 MG/DL
WBC # BLD AUTO: 9.8 K/UL (ref 4.8–10.8)
WBC #/AREA URNS HPF: ABNORMAL /HPF

## 2018-07-27 PROCEDURE — 700105 HCHG RX REV CODE 258: Performed by: EMERGENCY MEDICINE

## 2018-07-27 PROCEDURE — 83690 ASSAY OF LIPASE: CPT

## 2018-07-27 PROCEDURE — 96361 HYDRATE IV INFUSION ADD-ON: CPT

## 2018-07-27 PROCEDURE — 99407 BEHAV CHNG SMOKING > 10 MIN: CPT | Performed by: INTERNAL MEDICINE

## 2018-07-27 PROCEDURE — 99285 EMERGENCY DEPT VISIT HI MDM: CPT

## 2018-07-27 PROCEDURE — 96366 THER/PROPH/DIAG IV INF ADDON: CPT

## 2018-07-27 PROCEDURE — 93005 ELECTROCARDIOGRAM TRACING: CPT | Performed by: EMERGENCY MEDICINE

## 2018-07-27 PROCEDURE — 96365 THER/PROPH/DIAG IV INF INIT: CPT

## 2018-07-27 PROCEDURE — G0378 HOSPITAL OBSERVATION PER HR: HCPCS

## 2018-07-27 PROCEDURE — 90791 PSYCH DIAGNOSTIC EVALUATION: CPT

## 2018-07-27 PROCEDURE — 700105 HCHG RX REV CODE 258: Performed by: INTERNAL MEDICINE

## 2018-07-27 PROCEDURE — 81001 URINALYSIS AUTO W/SCOPE: CPT

## 2018-07-27 PROCEDURE — 99220 PR INITIAL OBSERVATION CARE,LEVL III: CPT | Performed by: INTERNAL MEDICINE

## 2018-07-27 PROCEDURE — 85025 COMPLETE CBC W/AUTO DIFF WBC: CPT

## 2018-07-27 PROCEDURE — 80053 COMPREHEN METABOLIC PANEL: CPT

## 2018-07-27 PROCEDURE — 700111 HCHG RX REV CODE 636 W/ 250 OVERRIDE (IP): Performed by: EMERGENCY MEDICINE

## 2018-07-27 PROCEDURE — 302970 POC BREATHALIZER: Performed by: EMERGENCY MEDICINE

## 2018-07-27 PROCEDURE — 94760 N-INVAS EAR/PLS OXIMETRY 1: CPT

## 2018-07-27 PROCEDURE — 84703 CHORIONIC GONADOTROPIN ASSAY: CPT

## 2018-07-27 PROCEDURE — 80307 DRUG TEST PRSMV CHEM ANLYZR: CPT

## 2018-07-27 RX ORDER — SODIUM CHLORIDE 9 MG/ML
INJECTION, SOLUTION INTRAVENOUS CONTINUOUS
Status: DISCONTINUED | OUTPATIENT
Start: 2018-07-27 | End: 2018-07-29

## 2018-07-27 RX ORDER — BISACODYL 10 MG
10 SUPPOSITORY, RECTAL RECTAL
Status: DISCONTINUED | OUTPATIENT
Start: 2018-07-27 | End: 2018-07-31 | Stop reason: HOSPADM

## 2018-07-27 RX ORDER — AMOXICILLIN 250 MG
2 CAPSULE ORAL 2 TIMES DAILY
Status: DISCONTINUED | OUTPATIENT
Start: 2018-07-27 | End: 2018-07-31 | Stop reason: HOSPADM

## 2018-07-27 RX ORDER — POTASSIUM CHLORIDE 20 MEQ/1
40 TABLET, EXTENDED RELEASE ORAL ONCE
Status: DISCONTINUED | OUTPATIENT
Start: 2018-07-27 | End: 2018-07-28

## 2018-07-27 RX ORDER — MAGNESIUM SULFATE HEPTAHYDRATE 40 MG/ML
2 INJECTION, SOLUTION INTRAVENOUS ONCE
Status: COMPLETED | OUTPATIENT
Start: 2018-07-27 | End: 2018-07-28

## 2018-07-27 RX ORDER — SODIUM CHLORIDE 9 MG/ML
1000 INJECTION, SOLUTION INTRAVENOUS ONCE
Status: COMPLETED | OUTPATIENT
Start: 2018-07-27 | End: 2018-07-27

## 2018-07-27 RX ORDER — NICOTINE 21 MG/24HR
21 PATCH, TRANSDERMAL 24 HOURS TRANSDERMAL
Status: DISCONTINUED | OUTPATIENT
Start: 2018-07-28 | End: 2018-07-31 | Stop reason: HOSPADM

## 2018-07-27 RX ORDER — ACETAMINOPHEN 325 MG/1
650 TABLET ORAL EVERY 6 HOURS PRN
Status: DISCONTINUED | OUTPATIENT
Start: 2018-07-27 | End: 2018-07-31 | Stop reason: HOSPADM

## 2018-07-27 RX ORDER — POLYETHYLENE GLYCOL 3350 17 G/17G
1 POWDER, FOR SOLUTION ORAL
Status: DISCONTINUED | OUTPATIENT
Start: 2018-07-27 | End: 2018-07-31 | Stop reason: HOSPADM

## 2018-07-27 RX ADMIN — SODIUM CHLORIDE 1000 ML: 9 INJECTION, SOLUTION INTRAVENOUS at 14:15

## 2018-07-27 RX ADMIN — MAGNESIUM SULFATE IN WATER 2 G: 40 INJECTION, SOLUTION INTRAVENOUS at 22:42

## 2018-07-27 RX ADMIN — SODIUM CHLORIDE: 9 INJECTION, SOLUTION INTRAVENOUS at 21:30

## 2018-07-27 ASSESSMENT — ENCOUNTER SYMPTOMS
BACK PAIN: 0
VOMITING: 0
SEIZURES: 0
NAUSEA: 0
SPUTUM PRODUCTION: 0
DIAPHORESIS: 0
CHILLS: 0
FLANK PAIN: 0
FEVER: 0
DIZZINESS: 0
BLOOD IN STOOL: 0
HEADACHES: 0
FOCAL WEAKNESS: 0
DEPRESSION: 1
MYALGIAS: 0
ABDOMINAL PAIN: 0
SHORTNESS OF BREATH: 0
BLURRED VISION: 0
BRUISES/BLEEDS EASILY: 0
SORE THROAT: 0
NECK PAIN: 0
PALPITATIONS: 0
DIARRHEA: 0
COUGH: 0
WHEEZING: 0

## 2018-07-27 ASSESSMENT — COPD QUESTIONNAIRES
COPD SCREENING SCORE: 2
DURING THE PAST 4 WEEKS HOW MUCH DID YOU FEEL SHORT OF BREATH: NONE/LITTLE OF THE TIME
HAVE YOU SMOKED AT LEAST 100 CIGARETTES IN YOUR ENTIRE LIFE: YES
DO YOU EVER COUGH UP ANY MUCUS OR PHLEGM?: NO/ONLY WITH OCCASIONAL COLDS OR INFECTIONS

## 2018-07-27 ASSESSMENT — LIFESTYLE VARIABLES
SUBSTANCE_ABUSE: 1
EVER_SMOKED: YES

## 2018-07-27 NOTE — ED PROVIDER NOTES
ED Provider Note    CHIEF COMPLAINT  Chief Complaint   Patient presents with   • Drug Overdose       HPI  Marva Tucker is a 37 y.o. female who presents to the emergency department complaining of a drug overdose.  The patient states she took a handful of trazodone a little more than an hour before coming in today.  She is not sure why she did this.  Cannot confirm or deny whether this was decided time.  Also drinks alcohol earlier this morning.  Denies any previous suicide attempt.  Denies any coingestants.  Denies any acute medical problems or complaints.  States she is depressed.  Will not answer why she took an overdose.  Denies any other acute concerns or complaints.    REVIEW OF SYSTEMS  See HPI for further details. All other systems are negative.    PAST MEDICAL HISTORY  Past Medical History:   Diagnosis Date   • Angina    • Bone spur    • Depression    • Hypertension        FAMILY HISTORY  History reviewed. No pertinent family history.    SOCIAL HISTORY  Social History     Social History   • Marital status:      Spouse name: N/A   • Number of children: N/A   • Years of education: N/A     Social History Main Topics   • Smoking status: Current Every Day Smoker     Packs/day: 0.50     Years: 18.00     Types: Cigarettes   • Smokeless tobacco: Never Used      Comment: 5 cigs/day   • Alcohol use Yes      Comment: rare   • Drug use: No   • Sexual activity: Not on file     Other Topics Concern   • Not on file     Social History Narrative   • No narrative on file       SURGICAL HISTORY  Past Surgical History:   Procedure Laterality Date   • GYN SURGERY         • TONSILLECTOMY         CURRENT MEDICATIONS  Home Medications     Reviewed by Kristy Davis R.N. (Registered Nurse) on 18 at 1339  Med List Status: Not Addressed   Medication Last Dose Status        Patient Tomas Taking any Medications                       ALLERGIES  No Known Allergies    PHYSICAL EXAM  VITAL SIGNS: /82    "Pulse 85   Temp 36.6 °C (97.9 °F)   Resp 14   Ht 1.651 m (5' 5\")   Wt 106.6 kg (235 lb)   SpO2 96%   BMI 39.11 kg/m²    Constitutional: Somnolent but arousable.  No acute cardiopulmonary distress.  HENT: Normocephalic, Atraumatic, Bilateral external ears normal, Oropharynx dry, No oral exudates, Nose normal.   Eyes: PERRL, EOMI, Conjunctiva normal, No discharge.   Neck: Normal range of motion, No tenderness, Supple, No stridor.   Cardiovascular: Normal heart rate, Normal rhythm, No murmurs, No rubs, No gallops.   Thorax & Lungs: Normal breath sounds, No respiratory distress, No wheezing, No chest tenderness.   Abdomen: Bowel sounds normal, Soft, No tenderness,     Skin: Warm, Dry, No erythema, No rash.   Back: No tenderness, No CVA tenderness.   Musculoskeletal: Good range of motion in all major joints.  Neurologic: Alert & oriented x 3, No focal deficits noted.   Psychiatric: Affect normal,    Results for orders placed or performed during the hospital encounter of 07/27/18   CBC WITH DIFFERENTIAL   Result Value Ref Range    WBC 9.8 4.8 - 10.8 K/uL    RBC 4.44 4.20 - 5.40 M/uL    Hemoglobin 13.6 12.0 - 16.0 g/dL    Hematocrit 39.5 37.0 - 47.0 %    MCV 89.0 81.4 - 97.8 fL    MCH 30.6 27.0 - 33.0 pg    MCHC 34.4 33.6 - 35.0 g/dL    RDW 41.8 35.9 - 50.0 fL    Platelet Count 349 164 - 446 K/uL    MPV 9.7 9.0 - 12.9 fL    Neutrophils-Polys 67.40 44.00 - 72.00 %    Lymphocytes 22.80 22.00 - 41.00 %    Monocytes 8.20 0.00 - 13.40 %    Eosinophils 0.90 0.00 - 6.90 %    Basophils 0.50 0.00 - 1.80 %    Immature Granulocytes 0.20 0.00 - 0.90 %    Nucleated RBC 0.00 /100 WBC    Neutrophils (Absolute) 6.59 2.00 - 7.15 K/uL    Lymphs (Absolute) 2.23 1.00 - 4.80 K/uL    Monos (Absolute) 0.80 0.00 - 0.85 K/uL    Eos (Absolute) 0.09 0.00 - 0.51 K/uL    Baso (Absolute) 0.05 0.00 - 0.12 K/uL    Immature Granulocytes (abs) 0.02 0.00 - 0.11 K/uL    NRBC (Absolute) 0.00 K/uL   COMP METABOLIC PANEL   Result Value Ref Range    Sodium " 138 135 - 145 mmol/L    Potassium 3.2 (L) 3.6 - 5.5 mmol/L    Chloride 107 96 - 112 mmol/L    Co2 20 20 - 33 mmol/L    Anion Gap 11.0 0.0 - 11.9    Glucose 114 (H) 65 - 99 mg/dL    Bun 11 8 - 22 mg/dL    Creatinine 0.65 0.50 - 1.40 mg/dL    Calcium 9.3 8.5 - 10.5 mg/dL    AST(SGOT) 19 12 - 45 U/L    ALT(SGPT) 10 2 - 50 U/L    Alkaline Phosphatase 54 30 - 99 U/L    Total Bilirubin 0.5 0.1 - 1.5 mg/dL    Albumin 4.3 3.2 - 4.9 g/dL    Total Protein 7.1 6.0 - 8.2 g/dL    Globulin 2.8 1.9 - 3.5 g/dL    A-G Ratio 1.5 g/dL   LIPASE   Result Value Ref Range    Lipase 31 11 - 82 U/L   HCG QUAL SERUM   Result Value Ref Range    Beta-Hcg Qualitative Serum Negative Negative   Urinalysis   Result Value Ref Range    Color Yellow     Character Clear     Specific Gravity 1.009 <1.035    Ph 5.5 5.0 - 8.0    Glucose Negative Negative mg/dL    Ketones 15 (A) Negative mg/dL    Protein Negative Negative mg/dL    Bilirubin Negative Negative    Urobilinogen, Urine 0.2 Negative    Nitrite Negative Negative    Leukocyte Esterase Negative Negative    Occult Blood Small (A) Negative    Micro Urine Req Microscopic    Acetaminophen Level   Result Value Ref Range    Acetaminophen -Tylenol <10 10 - 30 ug/mL   SALICYLATE LEVEL   Result Value Ref Range    Salicylates, Quant. 0 (L) 15 - 25 mg/dL   ESTIMATED GFR   Result Value Ref Range    GFR If African American >60 >60 mL/min/1.73 m 2    GFR If Non African American >60 >60 mL/min/1.73 m 2   COMP METABOLIC PANEL   Result Value Ref Range    Sodium 141 135 - 145 mmol/L    Potassium 3.5 (L) 3.6 - 5.5 mmol/L    Chloride 109 96 - 112 mmol/L    Co2 19 (L) 20 - 33 mmol/L    Anion Gap 13.0 (H) 0.0 - 11.9    Glucose 91 65 - 99 mg/dL    Bun 10 8 - 22 mg/dL    Creatinine 0.56 0.50 - 1.40 mg/dL    Calcium 8.7 8.5 - 10.5 mg/dL    AST(SGOT) 18 12 - 45 U/L    ALT(SGPT) 8 2 - 50 U/L    Alkaline Phosphatase 50 30 - 99 U/L    Total Bilirubin 0.7 0.1 - 1.5 mg/dL    Albumin 3.9 3.2 - 4.9 g/dL    Total Protein 6.5 6.0 -  8.2 g/dL    Globulin 2.6 1.9 - 3.5 g/dL    A-G Ratio 1.5 g/dL   ACETAMINOPHEN   Result Value Ref Range    Acetaminophen -Tylenol <10 10 - 30 ug/mL   URINE MICROSCOPIC (W/UA)   Result Value Ref Range    WBC 0-2 /hpf    RBC 20-50 (A) /hpf    Bacteria Few (A) None /hpf    Epithelial Cells Few /hpf    Hyaline Cast 0-2 /lpf   ESTIMATED GFR   Result Value Ref Range    GFR If African American >60 >60 mL/min/1.73 m 2    GFR If Non African American >60 >60 mL/min/1.73 m 2   POC BREATHALIZER   Result Value Ref Range    POC Breathalizer 0.000 0.00 - 0.01 Percent   EKG (NOW)   Result Value Ref Range    Report       [facility] Emergency Dept.    Test Date:  2018  Pt Name:    WU HELM                Department: ER  MRN:        [id number]                      Room:       [facility]  Gender:     Female                       Technician: [id number]  :        1980                   Requested By:JUN DONIS  Order #:    [id number]                    Reading MD: JUN DONIS. BERNADETTE    Measurements  Intervals                                Axis  Rate:       79                           P:          25  SC:         140                          QRS:        40  QRSD:       84                           T:          35  QT:         444  QTc:        510    Interpretive Statements  SINUS RHYTHM  BORDERLINE PROLONGED QT INTERVAL  Compared to ECG 2018 15:37:45  No significant changes    Electronically Signed On 2018 14:43:24 PDT by JUN DONIS. AMD        RADIOLOGY/PROCEDURES  No orders to display         COURSE & MEDICAL DECISION MAKING  Pertinent Labs & Imaging studies reviewed. (See chart for details)  The patient presents the emergency department for a trazodone overdose.  Poison control was consulted.  She was somewhat sleepy.  She also reports drinking alcohol.  An IV is established, she is put on O2, and monitoring.  EKG was obtained.    CBC, and CMP are unremarkable except for a slightly low  potassium.  Final level was nondetectable.  Salicylates were 0.  Breathalyzer was obtained this is ultimately negative.  Repeat Tylenol level remains undetectable.    The patient was initially low but tachycardic to liter of IV fluids was obtained.  This was done because she has a toxidrome when she appears dry with dry mucous membranes.      She had borderline prolonged QT so repeat EKG was performed.    Repeat EKG shows normal sinus rhythm.  She is normally tachycardic with a QT is still prolonged at nearly 500.      Medications   NS infusion 1,000 mL (0 mL Intravenous Stopped 7/27/18 0228)       Patient is reassessed after IV fluids.  And she is much better.    Patient was seen by [facility] but because of the persistent EKG abnormality and her persistent somnolence we will admit her.  I spoke to the hospitals, Dr. Canela.  He will see the patient for admission.    FINAL IMPRESSION  1. Intentional drug overdose, initial encounter ([facility])    2. Other depression    3. QT prolongation      *  2.   3.         Electronically signed by: Lenard Blackwood, 7/27/2018 1:50 PM

## 2018-07-27 NOTE — ED NOTES
Belongings checked by security, belongings placed in locker 12, pills to pharmacy, and cigarette, knife and lighter with security, wallet with $17.15 with ID and credit cards placed with safe keeping in PAR.

## 2018-07-27 NOTE — ED NOTES
"Patient to room 35, eval for overdose of trazodone, patient states she took a \"handful about a half hour ago\". Pill bottle has 58 remaining pills, unknown amount prior. Patient states she \"wasnt trying to kill herself\" however does admit to recent depression. Patient placed on cardiac monitors, all belongings removed including clothing and purse, to be checked by security.  Trazodone bottle brought to pharmacy.  "

## 2018-07-27 NOTE — ED NOTES
Spoke with poison control case # [id number], informed to cardiac monitor for 6 hours due to potential for EKG changes, as well as repeat cmp and tylenol level 4 hours after initial. MD notified.

## 2018-07-27 NOTE — ED TRIAGE NOTES
"Pt to triage by wheelchair. Pt states she was having a really bad day, per SO pt took and handful of her father's trazodone 50mg. When asked about SI, pt states \"I wouldn't go that far.\" Pt is lethargic but A&OXx4. Pt admits to drinking today also.    Charge RN aware, pt to G35, report to RN.       "

## 2018-07-28 PROCEDURE — 700102 HCHG RX REV CODE 250 W/ 637 OVERRIDE(OP): Performed by: INTERNAL MEDICINE

## 2018-07-28 PROCEDURE — 700105 HCHG RX REV CODE 258: Performed by: INTERNAL MEDICINE

## 2018-07-28 PROCEDURE — A9270 NON-COVERED ITEM OR SERVICE: HCPCS | Performed by: HOSPITALIST

## 2018-07-28 PROCEDURE — 96375 TX/PRO/DX INJ NEW DRUG ADDON: CPT

## 2018-07-28 PROCEDURE — 99225 PR SUBSEQUENT OBSERVATION CARE,LEVEL II: CPT | Performed by: HOSPITALIST

## 2018-07-28 PROCEDURE — 700102 HCHG RX REV CODE 250 W/ 637 OVERRIDE(OP): Performed by: HOSPITALIST

## 2018-07-28 PROCEDURE — G0378 HOSPITAL OBSERVATION PER HR: HCPCS

## 2018-07-28 PROCEDURE — A9270 NON-COVERED ITEM OR SERVICE: HCPCS | Performed by: INTERNAL MEDICINE

## 2018-07-28 PROCEDURE — 700111 HCHG RX REV CODE 636 W/ 250 OVERRIDE (IP): Performed by: FAMILY MEDICINE

## 2018-07-28 PROCEDURE — 700111 HCHG RX REV CODE 636 W/ 250 OVERRIDE (IP): Performed by: HOSPITALIST

## 2018-07-28 RX ORDER — KETOROLAC TROMETHAMINE 30 MG/ML
15 INJECTION, SOLUTION INTRAMUSCULAR; INTRAVENOUS EVERY 6 HOURS PRN
Status: DISCONTINUED | OUTPATIENT
Start: 2018-07-28 | End: 2018-07-31 | Stop reason: HOSPADM

## 2018-07-28 RX ORDER — HYDROXYZINE PAMOATE 50 MG/1
50 CAPSULE ORAL EVERY 6 HOURS PRN
Status: DISCONTINUED | OUTPATIENT
Start: 2018-07-28 | End: 2018-07-30

## 2018-07-28 RX ORDER — ESCITALOPRAM OXALATE 10 MG/1
10 TABLET ORAL DAILY
Status: DISCONTINUED | OUTPATIENT
Start: 2018-07-29 | End: 2018-07-31 | Stop reason: HOSPADM

## 2018-07-28 RX ORDER — POTASSIUM CHLORIDE 20 MEQ/1
40 TABLET, EXTENDED RELEASE ORAL ONCE
Status: COMPLETED | OUTPATIENT
Start: 2018-07-28 | End: 2018-07-28

## 2018-07-28 RX ORDER — HEPARIN SODIUM 5000 [USP'U]/ML
5000 INJECTION, SOLUTION INTRAVENOUS; SUBCUTANEOUS EVERY 8 HOURS
Status: DISCONTINUED | OUTPATIENT
Start: 2018-07-28 | End: 2018-07-31 | Stop reason: HOSPADM

## 2018-07-28 RX ADMIN — STANDARDIZED SENNA CONCENTRATE AND DOCUSATE SODIUM 2 TABLET: 8.6; 5 TABLET, FILM COATED ORAL at 06:17

## 2018-07-28 RX ADMIN — ACETAMINOPHEN 650 MG: 325 TABLET, FILM COATED ORAL at 06:25

## 2018-07-28 RX ADMIN — POTASSIUM CHLORIDE 40 MEQ: 1500 TABLET, EXTENDED RELEASE ORAL at 10:27

## 2018-07-28 RX ADMIN — NICOTINE 21 MG: 21 PATCH, EXTENDED RELEASE TRANSDERMAL at 06:17

## 2018-07-28 RX ADMIN — SODIUM CHLORIDE: 9 INJECTION, SOLUTION INTRAVENOUS at 11:55

## 2018-07-28 RX ADMIN — ACETAMINOPHEN 650 MG: 325 TABLET, FILM COATED ORAL at 18:56

## 2018-07-28 RX ADMIN — HEPARIN SODIUM 5000 UNITS: 5000 INJECTION, SOLUTION INTRAVENOUS; SUBCUTANEOUS at 22:05

## 2018-07-28 RX ADMIN — KETOROLAC TROMETHAMINE 15 MG: 30 INJECTION, SOLUTION INTRAMUSCULAR at 20:41

## 2018-07-28 ASSESSMENT — COGNITIVE AND FUNCTIONAL STATUS - GENERAL
MOBILITY SCORE: 22
SUGGESTED CMS G CODE MODIFIER MOBILITY: CJ
SUGGESTED CMS G CODE MODIFIER DAILY ACTIVITY: CH
CLIMB 3 TO 5 STEPS WITH RAILING: A LITTLE
WALKING IN HOSPITAL ROOM: A LITTLE
DAILY ACTIVITIY SCORE: 24

## 2018-07-28 ASSESSMENT — LIFESTYLE VARIABLES
ON A TYPICAL DAY WHEN YOU DRINK ALCOHOL HOW MANY DRINKS DO YOU HAVE: 3
EVER_SMOKED: YES
TOTAL SCORE: 0
HOW MANY TIMES IN THE PAST YEAR HAVE YOU HAD 5 OR MORE DRINKS IN A DAY: 1
EVER FELT BAD OR GUILTY ABOUT YOUR DRINKING: NO
TOTAL SCORE: 0
HAVE YOU EVER FELT YOU SHOULD CUT DOWN ON YOUR DRINKING: NO
AVERAGE NUMBER OF DAYS PER WEEK YOU HAVE A DRINK CONTAINING ALCOHOL: 7
HAVE PEOPLE ANNOYED YOU BY CRITICIZING YOUR DRINKING: NO
ALCOHOL_USE: YES
CONSUMPTION TOTAL: POSITIVE
EVER HAD A DRINK FIRST THING IN THE MORNING TO STEADY YOUR NERVES TO GET RID OF A HANGOVER: NO
TOTAL SCORE: 0

## 2018-07-28 ASSESSMENT — ENCOUNTER SYMPTOMS
CHILLS: 0
WHEEZING: 0
SHORTNESS OF BREATH: 0
CONSTIPATION: 0
DIZZINESS: 1
HEADACHES: 1
VOMITING: 0
DIARRHEA: 0
FEVER: 0
COUGH: 0
NAUSEA: 0
BACK PAIN: 1

## 2018-07-28 ASSESSMENT — PAIN SCALES - GENERAL
PAINLEVEL_OUTOF10: 1
PAINLEVEL_OUTOF10: 7

## 2018-07-28 ASSESSMENT — COPD QUESTIONNAIRES
HAVE YOU SMOKED AT LEAST 100 CIGARETTES IN YOUR ENTIRE LIFE: YES
DO YOU EVER COUGH UP ANY MUCUS OR PHLEGM?: NO/ONLY WITH OCCASIONAL COLDS OR INFECTIONS
COPD SCREENING SCORE: 3
DURING THE PAST 4 WEEKS HOW MUCH DID YOU FEEL SHORT OF BREATH: SOME OF THE TIME
IN THE PAST 12 MONTHS DO YOU DO LESS THAN YOU USED TO BECAUSE OF YOUR BREATHING PROBLEMS: DISAGREE/UNSURE

## 2018-07-28 ASSESSMENT — PATIENT HEALTH QUESTIONNAIRE - PHQ9
1. LITTLE INTEREST OR PLEASURE IN DOING THINGS: SEVERAL DAYS
SUM OF ALL RESPONSES TO PHQ9 QUESTIONS 1 AND 2: 4
2. FEELING DOWN, DEPRESSED, IRRITABLE, OR HOPELESS: NEARLY EVERY DAY

## 2018-07-28 NOTE — DISCHARGE PLANNING
Received Legal 2000, referral sent to all local Mental Health agencies with copy of Legal 2000 attached.

## 2018-07-28 NOTE — PSYCHIATRY
"PSYCHIATRIC CONSULTATION:  Reason for admission: Trazodone overdose  Reason for consult: Suicide attempt  Requesting Physician: Felix Canela M.D.  Supervising Physician: Dr. Tory Mccollum    Legal status: On legal 2000    Chief Complaint: \"My  left me two weeks ago.\"    HPI:     Ms. Marva Tucker is a 37 year old  female with history of depression and anxiety who was driven to the ED by her friend after she overdosed on a handful of trazodone pills. We are consulted for intentional OD/suicide attempt.    On interview, the patient says that a lot has been happening the past two weeks. Her  left her two weeks ago for another woman; she is currently unemployed and decided to go live with her parents, but they are leaving for [place], [place] in a week and she is facing homelessness. 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 She says that during the past two weeks she has been drinking 2 bottles of wine a day when normally she rarely drinks alcohol. She has felt depressed and hopeless, and in fact was about to check herself into a psychiatric hospital because \"I was afraid of what I might do to myself,\" when she tried to say goodbye to her ; when he didn't let her, she \"flipped out and I took a handful of pills\" which were her father's trazodone. She says that her friend was driving her from her 's place at the time and she was in the passenger seat; he then immediately drove her to the hospital. When asked if this suicide attempt was impulsive or planned in advance, she says, \"More impulsive,\" but then again she did not explain why she had her father's trazodone pills with her in the car.    Right now, she denies SI but still admits to feeling very depressed. She says, \"People are telling me it's my fault\" for the relationship being over. She is surprised that she overdosed because her best friend killed herself years ago and she " "remembers thinking that she would never do that herself. She does have some supportive friends here, however, and she does feel a sense of relief at being in the hospital and telling her story to someone. She has been with her  for 15 years and  since 2011; she reports that things were \"perfect\" before they got , but then afterwards he \"snapped\" and \"lost job after job\" as well as cheated on her. They were having a major argument before she found out that he was seeing another woman and didn't come home to her. She gives a history of being diagnosed as bipolar as a teen, but on further questioning she has not had any manic symptoms; nor did being on SSRIs in the past drive her into manic episodes. She reports that Lexapro worked for her in the past but has not seen a psychiatrist or doctor in over 20 years; she is open to re-starting Lexapro and going to inpatient psychiatry for a while (she does not want to go to [facility] however as she had a bad experience with her daughter's care there). She says, \"I think I've got to get out of [facility].\" She reports that she would like to go back to school and get a certificate to work as an . Her daughter is financially independent and living with her parents as well in the meantime; planning to move into her own place after they move.      Psychiatric Review of Systems:  Depression: + SI, anhedonia, guilt/worthlessness, insomnia, appetite loss, decreased energy  Shy: Denies  Anxiety/Panic Attacks : Endorses panic attacks \"nearly every day\"  PTSD symptom: Denies  Psychosis: Denies; no paranoia outside of meth use    Medical Review of Systems: All systems reviewed. Only those found to be + are noted below. All others are negative.   Neurological:    TBIs: Denies   SZs: Denies   Strokes: Denies    Psychiatric Examination:   Vitals: Weight/BMI: Body mass index is 38.15 kg/m². Blood pressure (!) 171/97, pulse 81, temperature 37.5 °C (99.5 °F), " "resp. rate 17, height 1.651 m (5' 5\"), weight 104 kg (229 lb 4.5 oz), SpO2 91 %.   Vitals:    07/27/18 2300 07/27/18 2330 07/28/18 0045 07/28/18 0410   BP:   134/84 (!) 171/97   Pulse: 85 82 94 81   Resp: 18  16 17   Temp:   37.2 °C (98.9 °F) 37.5 °C (99.5 °F)   SpO2: 92% 95% 97% 91%   Weight:   104 kg (229 lb 4.5 oz)    Height:         Appearance: Overweight  female with short blue highlighted hair dressed in hospital gown  Behavior: Open, cooperative  Thought Content: No SI/HI, no AVH, no delusions  Thought Process: Linear and goal-oriented  Speech: Normal rate and rhythm  Mood: \"1/10\"  Affect: Dysthymic, although able to laugh appropriately at times  Attention/Alertness: Attends well  Orientation/Memory: Oriented to person, place, date  Insight/Judgement:  Fair/fair    Past Psychiatric Hx:   Diagnosed as \"bipolar\" as a teen; has been on Zoloft, Paxil, lithium, and Effexor in the past; good results with Lexpro for depression. Has never had manic episode on my interview. She \"fired\" all her psychiatrists as a teen because she did not feel she was getting better. Denies any previous suicide attempt or hospitalization, has not seen a psychiatrist since her teens.    Family Psychiatric Hx:  Mother with anxiety; daughter with anxiety and BPD. Father with ETOH use disorder.    Social Hx:  Social History   Substance Use Topics   • Smoking status: Current Every Day Smoker     Packs/day: 0.50     Years: 18.00     Types: Cigarettes   • Smokeless tobacco: Never Used      Comment: 5 cigs/day   • Alcohol use Yes      Comment: rare       Drug/Alcohol/Tobacco Hx:   Drugs: Used 1 g of meth/3 days for the past 15 years; recently quit a few weeks before due to increasing paranoia, anxiety, and agitation; she was also fired from her latest job due to attendance issues with meth   Alcohol: Has been drinking 2 bottles of wine/day since her  left her   Tobacco: Smokes a pack a day    Born in [place], [place] to a " "supportive mother and father as an only child; reports that she did \"excellent\" in school and had a great childhood, denies abuse. She went to college and almost finished a certification in cosmotology/hair. Moved to [place] in 2002; her parents also live in the area but are moving. She got  in 2011 to her . She has a daughter who is 19 from a previous relationship.     Medical Hx:   Past Medical History:   Diagnosis Date   • Angina    • Bone spur    • Depression    • Hypertension        Allergies:   NKDA    Medications:  No current facility-administered medications on file prior to encounter.      No current outpatient prescriptions on file prior to encounter.       Labs:  Lab Results   Component Value Date/Time    WBC 9.8 07/27/2018 01:30 PM    RBC 4.44 07/27/2018 01:30 PM    HEMOGLOBIN 13.6 07/27/2018 01:30 PM    HEMATOCRIT 39.5 07/27/2018 01:30 PM    MCV 89.0 07/27/2018 01:30 PM    MCH 30.6 07/27/2018 01:30 PM    MCHC 34.4 07/27/2018 01:30 PM    MPV 9.7 07/27/2018 01:30 PM    NEUTSPOLYS 67.40 07/27/2018 01:30 PM    LYMPHOCYTES 22.80 07/27/2018 01:30 PM    MONOCYTES 8.20 07/27/2018 01:30 PM    EOSINOPHILS 0.90 07/27/2018 01:30 PM    BASOPHILS 0.50 07/27/2018 01:30 PM      Lab Results   Component Value Date/Time    SODIUM 141 07/27/2018 05:31 PM    POTASSIUM 3.5 (L) 07/27/2018 05:31 PM    CHLORIDE 109 07/27/2018 05:31 PM    CO2 19 (L) 07/27/2018 05:31 PM    GLUCOSE 91 07/27/2018 05:31 PM    BUN 10 07/27/2018 05:31 PM    CREATININE 0.56 07/27/2018 05:31 PM    CREATININE 0.8 06/30/2005 10:05 PM      Lab Results   Component Value Date/Time    ALTSGPT 8 07/27/2018 05:31 PM    ASTSGOT 18 07/27/2018 05:31 PM    ALKPHOSPHAT 50 07/27/2018 05:31 PM    TBILIRUBIN 0.7 07/27/2018 05:31 PM    LIPASE 31 07/27/2018 01:30 PM    ALBUMIN 3.9 07/27/2018 05:31 PM    GLOBULIN 2.6 07/27/2018 05:31 PM    INR 0.91 06/05/2018 03:47 PM     Lab Results   Component Value Date/Time    PROTHROMBTM 12.2 06/05/2018 03:47 PM    INR " 0.91 06/05/2018 03:47 PM        ECG: QTc 498 on 7/27/18    Cranial Imaging: reviewed    ASSESSMENT:   1. Adjustment disorder with disturbance of mood  2. Rule out alcohol-related mood disorder  3. Rule out major depressive disorder with SI  4. Unspecified anxiety disorder  5. Methamphetamine use disorder (recently in remission by patient's report)    This is a 37 year old  female with history of depression and anxiety who was admitted after intentional overdose; she has had major stressors in her life and has not seen a psychiatrist or any doctor in years; she would benefit from inpatient psychiatry and she is agreeable to this.    PLAN:  Legal status: extended today 7/28    - Start Lexapro 10 mg for depressive symptoms  - Start hydroxyzine 50 mg PRN anxiety      Psychiatry will follow.  Thank you for the consult.

## 2018-07-28 NOTE — ED NOTES
Per ERP request poison control contacted regarding QTc around 500.    Spoke with Cyndi    Recommended plan of care for QTc greater than 500  1-2grams Magnesium IV Push    Manage electrolytes  Potassium, phosphorus, and Magnesium     Monitor Magnesium     ERP notified. Orders received. Will continue to monitor.

## 2018-07-28 NOTE — PSYCHIATRY
BRIEF PSYCHIATRIC CONSULT NOTE: patient seen, full note to follow.  -Legal Hold:extended  -Sitter:yes  -Restrictions:   -phone:yes   -visitors:yes   -personal items: no  -Plan:continue to follow

## 2018-07-28 NOTE — CARE PLAN
Problem: Venous Thromboembolism (VTW)/Deep Vein Thrombosis (DVT) Prevention:  Goal: Patient will participate in Venous Thrombosis (VTE)/Deep Vein Thrombosis (DVT)Prevention Measures    Intervention: Encourage patient to perform ankle flex, foot rotation, and knee flex exercises in addition to other prophylatic measures every hour while awake  Pt edu provided, pt stated understanding,will continue to monitor.

## 2018-07-28 NOTE — ASSESSMENT & PLAN NOTE
Likely secondary to trazodone toxicity  Patient has been given IV mag 2 g  Telemetry monitoring  Repeat EKG QTc back to 445

## 2018-07-28 NOTE — CONSULTS
[facility]   TRIAGE ASSESSMENT    Name: Marva Tucker  MRN: [id number]  : 1980  Age: 37 y.o.  Date of assessment: 2018  PCP: Pcp Pt States None  Persons in attendance: Patient    CHIEF COMPLAINT/PRESENTING ISSUE as stated by patient, patient reports she took a handful of her father's trazadone today.  Her  left her 14 days ago.  She is depressed and can not see a future. She has been drinking alcohol daily the last 14 days.  Before that he rarely drank.  She is not on any medications.  She has taken Lexapro 20 mg q day most recently.  In the past, she took Lithium, Prozac, or Zoloft.  Denies any other mental health treatment except medications.  Sleep and appetite have been poor. Her best friend killed herself 14 years ago and an aunt also killed herself.  Reports she is a smoker and has hypertension.  Legal hold finalized for her safety.     Chief Complaint   Patient presents with   • Drug Overdose        CURRENT LIVING SITUATION/SOCIAL SUPPORT: has been living with her parents the last 2 weeks.  Her parents are moving to [place] in 3 days.  No one can come with them.  Patient does not have any children. She completed high school and has some college.  Currently, unemployed and has no income. She reports she has 2 friends here in [place].    BEHAVIORAL HEALTH TREATMENT HISTORY  Does patient/parent report a history of prior behavioral health treatment for patient?   No therapist nor has she seen a psychiatrist.  Only medications from her PCP.     SAFETY ASSESSMENT - SELF  Does patient acknowledge current or past symptoms of dangerousness to self? yes  Does parent/significant other report patient has current or past symptoms of dangerousness to self? N\A  Does presenting problem suggest symptoms of dangerousness to self? Yes:     Past Current    Suicidal Thoughts: []  [x]    Suicidal Plans: []  [x]    Suicidal Intent: []  [x]    Suicide Attempts: []  [x]    Self-Injury []  []      For any  boxes checked above, provide detail: No prior attempts.  Took an overdose today. Depressed and can not see a future.     History of suicide by family member: yes - an aunt  History of suicide by friend/significant other: yes - her best friend 14 years ago  Recent change in frequency/specificity/intensity of suicidal thoughts or self-harm behavior? yes - last 2 weeks  Current access to firearms, medications, or other identified means of suicide/self-harm? yes - denies access to guns  If yes, willing to restrict access to means of suicide/self-harm? yes - wants help  Protective factors present:  on legal hold    SAFETY ASSESSMENT - OTHERS  Does patient acknowledge current or past symptoms of aggressive behavior or risk to others? no  Does parent/significant other report patient has current or past symptoms of aggressive behavior or risk to others?  N\A  Does presenting problem suggest symptoms of dangerousness to others? No    Crisis Safety Plan completed and copy given to patient? No, but did give her a copy to fill in to help her focus on the future.  Very interested in [facility] wrap-around services.    ABUSE/NEGLECT SCREENING  Does patient report feeling “unsafe” in his/her home, or afraid of anyone?  no  Does patient report any history of physical, sexual, or emotional abuse?  no  Does parent or significant other report any of the above? N\A  Is there evidence of neglect by self?  no  Is there evidence of neglect by a caregiver? no  Does the patient/parent report any history of CPS/APS/police involvement related to suspected abuse/neglect or domestic violence? no  Based on the information provided during the current assessment, is a mandated report of suspected abuse/neglect being made?  No    SUBSTANCE USE SCREENING  Reports has been drinking alcohol the last 14 days but did not really drink before this.  Denies any drug use.        MENTAL STATUS   Participation: Active verbal participation, Attentive, Engaged  and Open to feedback  Grooming: Casual  Orientation: Alert and Fully Oriented  Behavior: Tense  Eye contact: Good  Mood: Depressed  Affect: Constricted  Thought process: Logical  Thought content: Within normal limits  Speech: Volume within normal limits  Perception: Within normal limits  Memory:  No gross evidence of memory deficits  Insight: Poor  Judgment:  Poor  Other:    Collateral information:   Source:  [] Significant other present in person:   [] Significant other by telephone  [] [facility]   [] [facility] Nursing Staff  [x] [facility]  [] Other:     [] Unable to complete full assessment due to:  [] Acute intoxication  [] Patient declined to participate/engage  [] Patient verbally unresponsive  [] Significant cognitive deficits  [] Significant perceptual distortions or behavioral disorganization  [] Other:      CLINICAL IMPRESSIONS:  Primary:  Adjustment disorder, Bipolar 2 vs MDD  Secondary:  Homeless       IDENTIFIED NEEDS/PLAN:  [Trigger DISPOSITION list for any items marked]    [x]  Imminent safety risk - self [] Imminent safety risk - others   []  Acute substance withdrawal []  Psychosis/Impaired reality testing   [x]  Mood/anxiety [x]  Substance use/Addictive behavior   []  Maladaptive behaviro []  Parent/child conflict   []  Family/Couples conflict []  Biomedical   [x]  Housing [x]  Financial   []   Legal  Occupational/Educational   []  Domestic violence []  Other:     Disposition: Refer to [facility]    Does patient express agreement with the above plan? yes    Referral appointment(s) scheduled? no    Alert team only: Patient is a 38 yo  white female whose  left her 14 days ago and she has been drinking since he left.  Took a hand full of her father's trazadone today.  Depressed and does not see a future.  Denies any prior mental health treatment, any prior suicide attempts, ever being arrested, access to guns, or ever being a cutter.  Only  medications.  I have discussed findings and recommendations with Dr. Blackwood who is in agreement with these recommendations.     Referral information sent to the following community providers :    If applicable : Referred  to : [facility]  for legal hold follow up at (time): 1840      Samia Byrd R.N.  7/27/2018

## 2018-07-28 NOTE — ED NOTES
Called Beth [facility]AHONA bed is ready. Pt is aware of POC. Pt belongings are on bed. Pt is ready for transport.

## 2018-07-28 NOTE — ASSESSMENT & PLAN NOTE
Monitor for signs of alcohol withdrawal  Patient has been counseled extensively on alcohol cessation

## 2018-07-28 NOTE — H&P
Hospital Medicine History & Physical Note    Date of Service  7/27/2018    Primary Care Physician  Pcp Pt States None    Consultants  Life skills    Code Status  Full code    Chief Complaint  Drug overdose    History of Presenting Illness  37 y.o. female who presented 7/27/2018 with intentional drug overdose.  Patient states she took a handful of trazodone earlier today.  She reports that she was feeling depressed and has been drinking excessive alcohol for the past 3 days.  She decided to take a handful of trazodone.  She denies active suicidal ideation at this time.  She denies any chest pain, shortness of breath, headache, nausea, vomiting or abdominal pain.  The patient does appear somnolent at this time.  An EKG revealed sinus rhythm with prolonged QT.  The patient was evaluated by life skills in the ER has been placed in the legal hold.    Review of Systems  Review of Systems   Constitutional: Negative for chills, diaphoresis and fever.   HENT: Negative for hearing loss and sore throat.    Eyes: Negative for blurred vision.   Respiratory: Negative for cough, sputum production, shortness of breath and wheezing.    Cardiovascular: Negative for chest pain, palpitations and leg swelling.   Gastrointestinal: Negative for abdominal pain, blood in stool, diarrhea, nausea and vomiting.   Genitourinary: Negative for dysuria, flank pain and urgency.   Musculoskeletal: Negative for back pain, joint pain, myalgias and neck pain.   Skin: Negative for rash.   Neurological: Negative for dizziness, focal weakness, seizures and headaches.   Endo/Heme/Allergies: Does not bruise/bleed easily.   Psychiatric/Behavioral: Positive for depression and substance abuse. Negative for suicidal ideas.   All other systems reviewed and are negative.      Past Medical History   has a past medical history of Angina; Bone spur; Depression; and Hypertension.    Surgical History   has a past surgical history that includes tonsillectomy and gyn  surgery.     Family History  History reviewed.  No pertinent family history    Social History   reports that she has been smoking Cigarettes.  She has a 9.00 pack-year smoking history. She has never used smokeless tobacco. She reports that she drinks alcohol. She reports that she does not use drugs.    Allergies  No Known Allergies    Medications  None       Physical Exam  Blood Pressure: 123/67   Temperature: 36.6 °C (97.9 °F)   Pulse: 93   Respiration: 16   Pulse Oximetry: 94 %     Physical Exam   Constitutional: She is oriented to person, place, and time. She appears well-developed and well-nourished. No distress.   HENT:   Head: Normocephalic and atraumatic.   Mouth/Throat: Oropharynx is clear and moist.   Eyes: Pupils are equal, round, and reactive to light. Conjunctivae are normal.   Neck: Neck supple.   Cardiovascular: Normal rate, regular rhythm and normal heart sounds.    Pulmonary/Chest: Effort normal and breath sounds normal. No respiratory distress. She has no wheezes. She has no rales.   Abdominal: Soft. Bowel sounds are normal. She exhibits no distension. There is no tenderness. There is no rebound.   Musculoskeletal: Normal range of motion. She exhibits no edema or tenderness.   Neurological: She is oriented to person, place, and time. No cranial nerve deficit. Coordination normal.   Somnolent, awakens to verbal stimuli  Answers questions and follows commands appropriately  Strength and sensation intact bilaterally   Skin: Skin is warm and dry.   Psychiatric: Her speech is delayed. She is slowed. She exhibits a depressed mood.   Nursing note and vitals reviewed.      Laboratory:  Recent Labs      07/27/18   1330   WBC  9.8   RBC  4.44   HEMOGLOBIN  13.6   HEMATOCRIT  39.5   MCV  89.0   MCH  30.6   MCHC  34.4   RDW  41.8   PLATELETCT  349   MPV  9.7     Recent Labs      07/27/18   1330  07/27/18   1731   SODIUM  138  141   POTASSIUM  3.2*  3.5*   CHLORIDE  107  109   CO2  20  19*   GLUCOSE  114*  91    BUN  11  10   CREATININE  0.65  0.56   CALCIUM  9.3  8.7     Recent Labs      07/27/18   1330  07/27/18   1731   ALTSGPT  10  8   ASTSGOT  19  18   ALKPHOSPHAT  54  50   TBILIRUBIN  0.5  0.7   LIPASE  31   --    GLUCOSE  114*  91                 Lab Results   Component Value Date    TROPONINI <0.02 06/05/2018       Urinalysis:    Lab Results   Component Value Date    SPECGRAVITY 1.009 07/27/2018    GLUCOSEUR Negative 07/27/2018    KETONES 15 (A) 07/27/2018    NITRITE Negative 07/27/2018    WBCURINE 0-2 07/27/2018    RBCURINE 20-50 (A) 07/27/2018    BACTERIA Few (A) 07/27/2018    EPITHELCELL Few 07/27/2018        Imaging:  No orders to display         Assessment/Plan:  I anticipate this patient is appropriate for observation status at this time.    Intentional drug overdose (HCC)- (present on admission)   Assessment & Plan    Patient took hand full of trazodone along with excessive alcohol  Monitor respiratory status closely  Psych consult  Legal hold  Suicide precautions        Hypokalemia   Assessment & Plan    Replace with K Dur 40  Replace mag  Monitor BMP        QT prolongation- (present on admission)   Assessment & Plan    Likely secondary to trazodone toxicity  Patient has been given IV mag 2 g  Telemetry monitoring          Tobacco abuse- (present on admission)   Assessment & Plan    Tobacco cessation education provided for more than 10 minutes, discussed options of nicotine patch, medical treatment with wellbutrin and chantix. Discussed the risks of smoking including increased risk of heart disease, stroke, cancer and COPD. Discussed the benefits of quitting smoking. Nicotine replacement protocol will be provided to the patient.          Alcohol abuse- (present on admission)   Assessment & Plan    Monitor for signs of alcohol withdrawal  Patient has been counseled extensively on alcohol cessation            VTE prophylaxis: SCD

## 2018-07-28 NOTE — ED NOTES
Received report from Veena BARAHONA. Pt care responsibilities assumed. Pt resting in bed, will continue to monitor. Pt has sitter at bedside.

## 2018-07-28 NOTE — ASSESSMENT & PLAN NOTE
Patient took hand full of trazodone along with excessive alcohol  Psych consult  Legal hold  Suicide precautions

## 2018-07-28 NOTE — PROGRESS NOTES
2 RN skin check:    Bruise found on left lower extremity.  No other skin issues found at this time.

## 2018-07-28 NOTE — ASSESSMENT & PLAN NOTE
Tobacco cessation education provided for more than 10 minutes, discussed options of nicotine patch, medical treatment with wellbutrin and chantix. Discussed the risks of smoking including increased risk of heart disease, stroke, cancer and COPD. Discussed the benefits of quitting smoking. Nicotine replacement protocol will be provided to the patient.

## 2018-07-28 NOTE — DISCHARGE PLANNING
Anticipated Discharge Disposition: DC to inpatient psych facility    Action: Pt on a legal hold. Dr Grande completed certification section and med clearance section.   Faxed to Bridget [facility] at [phone]. Original in chart.     Barriers to Discharge: await accepting psych facility    Plan: Call placed to Zuly AUSTIN to have her send behavioral health referrals in the area. Await an accepting facility.

## 2018-07-29 LAB
ALBUMIN SERPL BCP-MCNC: 3.1 G/DL (ref 3.2–4.9)
BUN SERPL-MCNC: 10 MG/DL (ref 8–22)
CALCIUM SERPL-MCNC: 8.2 MG/DL (ref 8.5–10.5)
CHLORIDE SERPL-SCNC: 109 MMOL/L (ref 96–112)
CO2 SERPL-SCNC: 23 MMOL/L (ref 20–33)
CREAT SERPL-MCNC: 0.68 MG/DL (ref 0.5–1.4)
ERYTHROCYTE [DISTWIDTH] IN BLOOD BY AUTOMATED COUNT: 44 FL (ref 35.9–50)
GLUCOSE SERPL-MCNC: 101 MG/DL (ref 65–99)
HCT VFR BLD AUTO: 35.9 % (ref 37–47)
HGB BLD-MCNC: 11.5 G/DL (ref 12–16)
MAGNESIUM SERPL-MCNC: 2.1 MG/DL (ref 1.5–2.5)
MCH RBC QN AUTO: 29.6 PG (ref 27–33)
MCHC RBC AUTO-ENTMCNC: 32 G/DL (ref 33.6–35)
MCV RBC AUTO: 92.5 FL (ref 81.4–97.8)
PHOSPHATE SERPL-MCNC: 2.8 MG/DL (ref 2.5–4.5)
PLATELET # BLD AUTO: 299 K/UL (ref 164–446)
PMV BLD AUTO: 9.8 FL (ref 9–12.9)
POTASSIUM SERPL-SCNC: 3.4 MMOL/L (ref 3.6–5.5)
RBC # BLD AUTO: 3.88 M/UL (ref 4.2–5.4)
SODIUM SERPL-SCNC: 139 MMOL/L (ref 135–145)
WBC # BLD AUTO: 7.5 K/UL (ref 4.8–10.8)

## 2018-07-29 PROCEDURE — A9270 NON-COVERED ITEM OR SERVICE: HCPCS | Performed by: HOSPITALIST

## 2018-07-29 PROCEDURE — 80069 RENAL FUNCTION PANEL: CPT

## 2018-07-29 PROCEDURE — 36415 COLL VENOUS BLD VENIPUNCTURE: CPT

## 2018-07-29 PROCEDURE — 700102 HCHG RX REV CODE 250 W/ 637 OVERRIDE(OP): Performed by: STUDENT IN AN ORGANIZED HEALTH CARE EDUCATION/TRAINING PROGRAM

## 2018-07-29 PROCEDURE — A9270 NON-COVERED ITEM OR SERVICE: HCPCS | Performed by: STUDENT IN AN ORGANIZED HEALTH CARE EDUCATION/TRAINING PROGRAM

## 2018-07-29 PROCEDURE — 96376 TX/PRO/DX INJ SAME DRUG ADON: CPT

## 2018-07-29 PROCEDURE — 83735 ASSAY OF MAGNESIUM: CPT

## 2018-07-29 PROCEDURE — 700105 HCHG RX REV CODE 258: Performed by: INTERNAL MEDICINE

## 2018-07-29 PROCEDURE — 700111 HCHG RX REV CODE 636 W/ 250 OVERRIDE (IP): Performed by: FAMILY MEDICINE

## 2018-07-29 PROCEDURE — A9270 NON-COVERED ITEM OR SERVICE: HCPCS | Performed by: INTERNAL MEDICINE

## 2018-07-29 PROCEDURE — 700102 HCHG RX REV CODE 250 W/ 637 OVERRIDE(OP): Performed by: HOSPITALIST

## 2018-07-29 PROCEDURE — 93005 ELECTROCARDIOGRAM TRACING: CPT | Performed by: HOSPITALIST

## 2018-07-29 PROCEDURE — 85027 COMPLETE CBC AUTOMATED: CPT

## 2018-07-29 PROCEDURE — 93010 ELECTROCARDIOGRAM REPORT: CPT | Performed by: INTERNAL MEDICINE

## 2018-07-29 PROCEDURE — 99225 PR SUBSEQUENT OBSERVATION CARE,LEVEL II: CPT | Performed by: HOSPITALIST

## 2018-07-29 PROCEDURE — G0378 HOSPITAL OBSERVATION PER HR: HCPCS

## 2018-07-29 PROCEDURE — 700102 HCHG RX REV CODE 250 W/ 637 OVERRIDE(OP): Performed by: INTERNAL MEDICINE

## 2018-07-29 RX ORDER — POTASSIUM CHLORIDE 20 MEQ/1
40 TABLET, EXTENDED RELEASE ORAL ONCE
Status: COMPLETED | OUTPATIENT
Start: 2018-07-29 | End: 2018-07-29

## 2018-07-29 RX ORDER — HYDROCODONE BITARTRATE AND ACETAMINOPHEN 5; 325 MG/1; MG/1
1 TABLET ORAL EVERY 6 HOURS PRN
Status: DISCONTINUED | OUTPATIENT
Start: 2018-07-29 | End: 2018-07-31 | Stop reason: HOSPADM

## 2018-07-29 RX ADMIN — ESCITALOPRAM OXALATE 10 MG: 10 TABLET ORAL at 05:15

## 2018-07-29 RX ADMIN — POTASSIUM CHLORIDE 40 MEQ: 1500 TABLET, EXTENDED RELEASE ORAL at 09:32

## 2018-07-29 RX ADMIN — SODIUM CHLORIDE: 9 INJECTION, SOLUTION INTRAVENOUS at 00:34

## 2018-07-29 RX ADMIN — HYDROCODONE BITARTRATE AND ACETAMINOPHEN 1 TABLET: 5; 325 TABLET ORAL at 11:15

## 2018-07-29 RX ADMIN — KETOROLAC TROMETHAMINE 15 MG: 30 INJECTION, SOLUTION INTRAMUSCULAR at 09:32

## 2018-07-29 RX ADMIN — NICOTINE 21 MG: 21 PATCH, EXTENDED RELEASE TRANSDERMAL at 05:15

## 2018-07-29 RX ADMIN — HYDROCODONE BITARTRATE AND ACETAMINOPHEN 1 TABLET: 5; 325 TABLET ORAL at 17:20

## 2018-07-29 ASSESSMENT — ENCOUNTER SYMPTOMS
FEVER: 0
HEADACHES: 1
VOMITING: 0
DIZZINESS: 1
CONSTIPATION: 0
NAUSEA: 0
CHILLS: 0
WHEEZING: 0
DIARRHEA: 0
SHORTNESS OF BREATH: 0
COUGH: 0
BACK PAIN: 1

## 2018-07-29 ASSESSMENT — PAIN SCALES - GENERAL
PAINLEVEL_OUTOF10: 7
PAINLEVEL_OUTOF10: 2

## 2018-07-29 NOTE — PROGRESS NOTES
[facility] Hospitalist Progress Note    Date of Service: 2018    Chief Complaint  37 y.o. female admitted 2018 with SA w/ trazodone OD     Interval Problem Update  : Seen by psych. Legal hold certified. KARIS . Repleting K.     Disposition  Pending xfr to [facility] health facility        Review of Systems   Constitutional: Negative for chills and fever.   Respiratory: Negative for cough, shortness of breath and wheezing.    Cardiovascular: Negative for chest pain.   Gastrointestinal: Negative for constipation, diarrhea, nausea and vomiting.   Genitourinary: Negative for dysuria.   Musculoskeletal: Positive for back pain.   Neurological: Positive for dizziness and headaches.      Physical Exam  Laboratory/Imaging   Hemodynamics  Temp (24hrs), Av.1 °C (98.8 °F), Min:36.7 °C (98.1 °F), Max:37.5 °C (99.5 °F)   Temperature: 36.7 °C (98.1 °F)  Pulse  Av.1  Min: 78  Max: 103 Heart Rate (Monitored): 95  Blood Pressure: 126/83, NIBP: 128/71      Respiratory      Respiration: 18, Pulse Oximetry: 92 %, O2 Daily Delivery Respiratory : Room Air with O2 Available             Fluids    Intake/Output Summary (Last 24 hours) at 18 1843  Last data filed at 18 1736   Gross per 24 hour   Intake              600 ml   Output                0 ml   Net              600 ml       Nutrition  Orders Placed This Encounter   Procedures   • Diet Order Regular (Plasticware only please)     Standing Status:   Standing     Number of Occurrences:   1     Order Specific Question:   Diet:     Answer:   Regular [1]     Comments:   Plasticware only please     Physical Exam   Constitutional: She appears well-developed.   HENT:   Head: Normocephalic.   Eyes: Conjunctivae are normal.   Cardiovascular: Normal rate.  Exam reveals no gallop.    Pulmonary/Chest: No respiratory distress. She has no wheezes.   Abdominal: She exhibits no distension. There is no tenderness.   Neurological: She is alert.       Recent Labs      18    1330   WBC  9.8   RBC  4.44   HEMOGLOBIN  13.6   HEMATOCRIT  39.5   MCV  89.0   MCH  30.6   MCHC  34.4   RDW  41.8   PLATELETCT  349   MPV  9.7     Recent Labs      07/27/18   1330  07/27/18   1731   SODIUM  138  141   POTASSIUM  3.2*  3.5*   CHLORIDE  107  109   CO2  20  19*   GLUCOSE  114*  91   BUN  11  10   CREATININE  0.65  0.56   CALCIUM  9.3  8.7                      Assessment/Plan     Intentional drug overdose (HCC)- (present on admission)   Assessment & Plan    Patient took hand full of trazodone along with excessive alcohol  Psych consult  Legal hold  Suicide precautions        Hypokalemia   Assessment & Plan    Repleting as needed         QT prolongation- (present on admission)   Assessment & Plan    Likely secondary to trazodone toxicity  Patient has been given IV mag 2 g  Telemetry monitoring        Tobacco abuse- (present on admission)   Assessment & Plan    Tobacco cessation education provided for more than 10 minutes, discussed options of nicotine patch, medical treatment with wellbutrin and chantix. Discussed the risks of smoking including increased risk of heart disease, stroke, cancer and COPD. Discussed the benefits of quitting smoking. Nicotine replacement protocol will be provided to the patient.          Alcohol abuse- (present on admission)   Assessment & Plan    Monitor for signs of alcohol withdrawal  Patient has been counseled extensively on alcohol cessation          Quality-Core Measures   Reviewed items::  Labs reviewed and Medications reviewed  Boykin catheter::  No Boykin  DVT prophylaxis pharmacological::  Heparin

## 2018-07-29 NOTE — DIETARY
NUTRITION SERVICES: BMI - Pt with BMI >40 (=40.02). Weight loss counseling not appropriate in acute care setting. RECOMMEND - Referral to outpatient nutrition services for weight management after D/C.

## 2018-07-30 PROBLEM — I10 BENIGN ESSENTIAL HTN: Status: ACTIVE | Noted: 2018-07-30

## 2018-07-30 LAB
ALBUMIN SERPL BCP-MCNC: 3.6 G/DL (ref 3.2–4.9)
BUN SERPL-MCNC: 9 MG/DL (ref 8–22)
CALCIUM SERPL-MCNC: 9.1 MG/DL (ref 8.5–10.5)
CHLORIDE SERPL-SCNC: 106 MMOL/L (ref 96–112)
CO2 SERPL-SCNC: 25 MMOL/L (ref 20–33)
CREAT SERPL-MCNC: 0.6 MG/DL (ref 0.5–1.4)
EKG IMPRESSION: NORMAL
ERYTHROCYTE [DISTWIDTH] IN BLOOD BY AUTOMATED COUNT: 42.1 FL (ref 35.9–50)
GLUCOSE SERPL-MCNC: 93 MG/DL (ref 65–99)
HCT VFR BLD AUTO: 38.3 % (ref 37–47)
HGB BLD-MCNC: 12.7 G/DL (ref 12–16)
MCH RBC QN AUTO: 30.1 PG (ref 27–33)
MCHC RBC AUTO-ENTMCNC: 33.2 G/DL (ref 33.6–35)
MCV RBC AUTO: 90.8 FL (ref 81.4–97.8)
PHOSPHATE SERPL-MCNC: 3.4 MG/DL (ref 2.5–4.5)
PLATELET # BLD AUTO: 324 K/UL (ref 164–446)
PMV BLD AUTO: 9.5 FL (ref 9–12.9)
POTASSIUM SERPL-SCNC: 3.8 MMOL/L (ref 3.6–5.5)
RBC # BLD AUTO: 4.22 M/UL (ref 4.2–5.4)
SODIUM SERPL-SCNC: 136 MMOL/L (ref 135–145)
WBC # BLD AUTO: 8.8 K/UL (ref 4.8–10.8)

## 2018-07-30 PROCEDURE — A9270 NON-COVERED ITEM OR SERVICE: HCPCS | Performed by: STUDENT IN AN ORGANIZED HEALTH CARE EDUCATION/TRAINING PROGRAM

## 2018-07-30 PROCEDURE — 85027 COMPLETE CBC AUTOMATED: CPT

## 2018-07-30 PROCEDURE — 36415 COLL VENOUS BLD VENIPUNCTURE: CPT

## 2018-07-30 PROCEDURE — 99225 PR SUBSEQUENT OBSERVATION CARE,LEVEL II: CPT | Performed by: HOSPITALIST

## 2018-07-30 PROCEDURE — A9270 NON-COVERED ITEM OR SERVICE: HCPCS | Performed by: HOSPITALIST

## 2018-07-30 PROCEDURE — 700102 HCHG RX REV CODE 250 W/ 637 OVERRIDE(OP): Performed by: HOSPITALIST

## 2018-07-30 PROCEDURE — G0378 HOSPITAL OBSERVATION PER HR: HCPCS

## 2018-07-30 PROCEDURE — A9270 NON-COVERED ITEM OR SERVICE: HCPCS | Performed by: INTERNAL MEDICINE

## 2018-07-30 PROCEDURE — 700102 HCHG RX REV CODE 250 W/ 637 OVERRIDE(OP): Performed by: STUDENT IN AN ORGANIZED HEALTH CARE EDUCATION/TRAINING PROGRAM

## 2018-07-30 PROCEDURE — 80069 RENAL FUNCTION PANEL: CPT

## 2018-07-30 PROCEDURE — 700102 HCHG RX REV CODE 250 W/ 637 OVERRIDE(OP): Performed by: INTERNAL MEDICINE

## 2018-07-30 RX ORDER — LISINOPRIL 20 MG/1
20 TABLET ORAL
Status: DISCONTINUED | OUTPATIENT
Start: 2018-07-30 | End: 2018-07-31 | Stop reason: HOSPADM

## 2018-07-30 RX ORDER — HYDROXYZINE PAMOATE 25 MG/1
25 CAPSULE ORAL EVERY 6 HOURS PRN
Status: DISCONTINUED | OUTPATIENT
Start: 2018-07-30 | End: 2018-07-31 | Stop reason: HOSPADM

## 2018-07-30 RX ADMIN — HYDROCODONE BITARTRATE AND ACETAMINOPHEN 1 TABLET: 5; 325 TABLET ORAL at 07:33

## 2018-07-30 RX ADMIN — HYDROCODONE BITARTRATE AND ACETAMINOPHEN 1 TABLET: 5; 325 TABLET ORAL at 20:10

## 2018-07-30 RX ADMIN — ESCITALOPRAM OXALATE 10 MG: 10 TABLET ORAL at 06:34

## 2018-07-30 RX ADMIN — HYDROCODONE BITARTRATE AND ACETAMINOPHEN 1 TABLET: 5; 325 TABLET ORAL at 13:42

## 2018-07-30 RX ADMIN — LISINOPRIL 20 MG: 20 TABLET ORAL at 13:52

## 2018-07-30 RX ADMIN — HYDROCODONE BITARTRATE AND ACETAMINOPHEN 1 TABLET: 5; 325 TABLET ORAL at 01:06

## 2018-07-30 RX ADMIN — NICOTINE 21 MG: 21 PATCH, EXTENDED RELEASE TRANSDERMAL at 06:34

## 2018-07-30 ASSESSMENT — ENCOUNTER SYMPTOMS
BACK PAIN: 1
CONSTIPATION: 0
CHILLS: 0
DIZZINESS: 1
NAUSEA: 0
FEVER: 0
VOMITING: 0
SHORTNESS OF BREATH: 0
DIARRHEA: 0
COUGH: 0
HEADACHES: 0

## 2018-07-30 ASSESSMENT — PAIN SCALES - GENERAL
PAINLEVEL_OUTOF10: 6
PAINLEVEL_OUTOF10: 7
PAINLEVEL_OUTOF10: 6
PAINLEVEL_OUTOF10: 0
PAINLEVEL_OUTOF10: 6
PAINLEVEL_OUTOF10: 0

## 2018-07-30 NOTE — DISCHARGE PLANNING
"LSW tc to [facility] at [facility] to verify fax number due to fax error message.  Per [facility], LSW has correct fax number ([phone]).  [facility] states that the fax machine is \"having issues\".  LSW to refax and follow up with [facility] to verify that referral is received.   "

## 2018-07-30 NOTE — PROGRESS NOTES
Bedside report received. Patient A&O x4. RA. SR on the monitor.  Pt has 1:1 sitter. Complains of pain 6/10 in abdomen medicated per MAR. POC discussed with patient. Patient verbalized understanding. Call light and belongings within reach. Bed locked and in lowest position, alarm and fall precautions in place.

## 2018-07-30 NOTE — DISCHARGE PLANNING
Anticipated Discharge Disposition: D/C to inpatient psych facility    Action: LSW faxed [facility] Referral to [facility]    Barriers to Discharge: Acceptance to psych facility; [facility] assessment    Plan: Patient to D/C to inpatient psych facility.

## 2018-07-30 NOTE — PROGRESS NOTES
At [address] called and asked for the [facility] Assist to be faxed.

## 2018-07-30 NOTE — CARE PLAN
Problem: Safety  Goal: Will remain free from injury  Outcome: PROGRESSING AS EXPECTED  Bed locked and in lowest position. Pt is up self, steady on feet. Safety sitter is at the bedside for 1:1 monitoring. Treaded socks provided. Call light and belongings within reach.  Patient educated to call for assistance. Pt verbalized understanding. Hourly rounding in place.      Problem: Pain Management  Goal: Pain level will decrease to patient's comfort goal  Outcome: PROGRESSING AS EXPECTED  Pt administered pain mediation per MAR.  Non-pharmacological forms of pain management offered.

## 2018-07-30 NOTE — PROGRESS NOTES
[facility] Hospitalist Progress Note    Date of Service: 2018    Chief Complaint  37 y.o. female admitted 2018 with SA w/ trazodone OD     Interval Problem Update  : Seen by psych. Legal hold certified. KARIS . Repleting K.   : panic attack overnight. DC IVF. Repleting K. Added norco PRN    Disposition  Pending xfr to [facility] health facility        Review of Systems   Constitutional: Negative for chills and fever.   Respiratory: Negative for cough, shortness of breath and wheezing.    Cardiovascular: Negative for chest pain.   Gastrointestinal: Negative for constipation, diarrhea, nausea and vomiting.   Genitourinary: Negative for dysuria.   Musculoskeletal: Positive for back pain.   Neurological: Positive for dizziness and headaches.      Physical Exam  Laboratory/Imaging   Hemodynamics  Temp (24hrs), Av.9 °C (98.4 °F), Min:36.6 °C (97.9 °F), Max:37.1 °C (98.8 °F)   Temperature: 36.6 °C (97.9 °F)  Pulse  Av.8  Min: 65  Max: 103    Blood Pressure: 153/111      Respiratory      Respiration: 20, Pulse Oximetry: 98 %             Fluids    Intake/Output Summary (Last 24 hours) at 18 1722  Last data filed at 18 1830   Gross per 24 hour   Intake              600 ml   Output                0 ml   Net              600 ml       Nutrition  Orders Placed This Encounter   Procedures   • Diet Order Regular (Plasticware only please)     Standing Status:   Standing     Number of Occurrences:   1     Order Specific Question:   Diet:     Answer:   Regular [1]     Comments:   Plasticware only please     Physical Exam   Constitutional: She appears well-developed.   HENT:   Head: Normocephalic.   Eyes: Conjunctivae are normal.   Cardiovascular: Normal rate.  Exam reveals no gallop.    Pulmonary/Chest: No respiratory distress. She has no wheezes.   Abdominal: She exhibits no distension. There is no tenderness.   Neurological: She is alert.       Recent Labs      18   1330  18   0424   WBC   9.8  7.5   RBC  4.44  3.88*   HEMOGLOBIN  13.6  11.5*   HEMATOCRIT  39.5  35.9*   MCV  89.0  92.5   MCH  30.6  29.6   MCHC  34.4  32.0*   RDW  41.8  44.0   PLATELETCT  349  299   MPV  9.7  9.8     Recent Labs      07/27/18   1330  07/27/18   1731  07/29/18   0424   SODIUM  138  141  139   POTASSIUM  3.2*  3.5*  3.4*   CHLORIDE  107  109  109   CO2  20  19*  23   GLUCOSE  114*  91  101*   BUN  11  10  10   CREATININE  0.65  0.56  0.68   CALCIUM  9.3  8.7  8.2*                      Assessment/Plan     Intentional drug overdose (HCC)- (present on admission)   Assessment & Plan    Patient took hand full of trazodone along with excessive alcohol  Psych consult  Legal hold  Suicide precautions        Hypokalemia   Assessment & Plan    Repleting as needed         QT prolongation- (present on admission)   Assessment & Plan    Likely secondary to trazodone toxicity  Patient has been given IV mag 2 g  Telemetry monitoring  Repeat EKG PENDING        Tobacco abuse- (present on admission)   Assessment & Plan    Tobacco cessation education provided for more than 10 minutes, discussed options of nicotine patch, medical treatment with wellbutrin and chantix. Discussed the risks of smoking including increased risk of heart disease, stroke, cancer and COPD. Discussed the benefits of quitting smoking. Nicotine replacement protocol will be provided to the patient.          Alcohol abuse- (present on admission)   Assessment & Plan    Monitor for signs of alcohol withdrawal  Patient has been counseled extensively on alcohol cessation          Quality-Core Measures   Reviewed items::  Labs reviewed and Medications reviewed  Boykin catheter::  No Boykin  DVT prophylaxis pharmacological::  Heparin

## 2018-07-31 VITALS
OXYGEN SATURATION: 97 % | BODY MASS INDEX: 39.08 KG/M2 | WEIGHT: 234.57 LBS | HEIGHT: 65 IN | SYSTOLIC BLOOD PRESSURE: 133 MMHG | RESPIRATION RATE: 18 BRPM | HEART RATE: 61 BPM | TEMPERATURE: 98.1 F | DIASTOLIC BLOOD PRESSURE: 97 MMHG

## 2018-07-31 PROBLEM — E87.6 HYPOKALEMIA: Status: RESOLVED | Noted: 2018-07-27 | Resolved: 2018-07-31

## 2018-07-31 PROCEDURE — 700102 HCHG RX REV CODE 250 W/ 637 OVERRIDE(OP): Performed by: STUDENT IN AN ORGANIZED HEALTH CARE EDUCATION/TRAINING PROGRAM

## 2018-07-31 PROCEDURE — 99217 PR OBSERVATION CARE DISCHARGE: CPT | Performed by: HOSPITALIST

## 2018-07-31 PROCEDURE — A9270 NON-COVERED ITEM OR SERVICE: HCPCS | Performed by: STUDENT IN AN ORGANIZED HEALTH CARE EDUCATION/TRAINING PROGRAM

## 2018-07-31 PROCEDURE — 700102 HCHG RX REV CODE 250 W/ 637 OVERRIDE(OP): Performed by: INTERNAL MEDICINE

## 2018-07-31 PROCEDURE — 700102 HCHG RX REV CODE 250 W/ 637 OVERRIDE(OP): Performed by: HOSPITALIST

## 2018-07-31 PROCEDURE — A9270 NON-COVERED ITEM OR SERVICE: HCPCS | Performed by: HOSPITALIST

## 2018-07-31 PROCEDURE — G0378 HOSPITAL OBSERVATION PER HR: HCPCS

## 2018-07-31 PROCEDURE — A9270 NON-COVERED ITEM OR SERVICE: HCPCS | Performed by: INTERNAL MEDICINE

## 2018-07-31 RX ORDER — LISINOPRIL 20 MG/1
20 TABLET ORAL DAILY
Qty: 30 TAB
Start: 2018-08-01

## 2018-07-31 RX ORDER — ESCITALOPRAM OXALATE 10 MG/1
10 TABLET ORAL DAILY
Qty: 30 TAB
Start: 2018-08-01

## 2018-07-31 RX ADMIN — HYDROCODONE BITARTRATE AND ACETAMINOPHEN 1 TABLET: 5; 325 TABLET ORAL at 14:10

## 2018-07-31 RX ADMIN — LISINOPRIL 20 MG: 20 TABLET ORAL at 05:04

## 2018-07-31 RX ADMIN — ESCITALOPRAM OXALATE 10 MG: 10 TABLET ORAL at 05:04

## 2018-07-31 RX ADMIN — HYDROCODONE BITARTRATE AND ACETAMINOPHEN 1 TABLET: 5; 325 TABLET ORAL at 02:11

## 2018-07-31 RX ADMIN — HYDROCODONE BITARTRATE AND ACETAMINOPHEN 1 TABLET: 5; 325 TABLET ORAL at 08:14

## 2018-07-31 RX ADMIN — NICOTINE 21 MG: 21 PATCH, EXTENDED RELEASE TRANSDERMAL at 05:03

## 2018-07-31 ASSESSMENT — PAIN SCALES - GENERAL
PAINLEVEL_OUTOF10: 0
PAINLEVEL_OUTOF10: 6
PAINLEVEL_OUTOF10: 7
PAINLEVEL_OUTOF10: 7
PAINLEVEL_OUTOF10: 6
PAINLEVEL_OUTOF10: 3

## 2018-07-31 NOTE — PROGRESS NOTES
Report received from AM RN; assumed pt care; assessment complete; pt sitting up in bed, awake, pt alert and oriented x4; plan of care discussed; pt verbalized understanding; 1:1 sitter present; call light within reach; bed in lowest position; will continue to monitor.

## 2018-07-31 NOTE — PROGRESS NOTES
Pt discharged via [facility] unwilling to leave, pt and her mother were verbally aggressive toward this RN and the Charge RN, pt decided to walk out with [facility], pts mother stormed off the floor, security was called.

## 2018-07-31 NOTE — DISCHARGE INSTRUCTIONS
Discharge Instructions    Discharged to inpatient psych by medical transportation with self. Discharged via wheelchair, hospital escort: Yes.  Special equipment needed: Not Applicable    Be sure to schedule a follow-up appointment with your primary care doctor or any specialists as instructed.     Discharge Plan:   Diet Plan: Discussed  Activity Level: Discussed  Smoking Cessation Offered: Patient Counseled  Confirmed Follow up Appointment: No (Comments) (pt going to inpatient psych )  Confirmed Symptoms Management: Discussed  Medication Reconciliation Updated: Yes  Influenza Vaccine Indication: Patient Refuses    I understand that a diet low in cholesterol, fat, and sodium is recommended for good health. Unless I have been given specific instructions below for another diet, I accept this instruction as my diet prescription.   Other diet: Regular Diet as tolerated     Special Instructions: None    · Is patient discharged on Warfarin / Coumadin?   No     Drug Overdose  A drug overdose happens when you take too much of a drug. An overdose can occur with illegal drugs, prescription drugs, or over-the-counter (OTC) drugs.  The effects of a drug overdose can be mild, dangerous, or even deadly.  What are the causes?  This condition may be caused by:  · Taking too much of a drug by accident.  · Taking too much of a drug on purpose.  · An error made by a health care provider who prescribes a drug.  · An error made by the pharmacist who fills the prescription order.  Drugs that commonly cause overdose include:  · Mental health drugs.  · Pain medicines.  · Illegal drugs.  · OTC cough and cold medicines.  · Heart medicines.  · Seizure medicines.  What increases the risk?  A drug overdose is more likely in:  · Children. They may be attracted to colorful pills. Because of a child's small size, even a small amount of a drug can be dangerous.  · Elderly people. They may be taking many different drugs. Elderly people may have  difficulty reading labels or remembering when they last took their medicine.  The risk of a drug overdose is also higher for someone who:  · Takes illegal drugs.  · Takes a drug and drinks alcohol.  · Has a mental health condition.  What are the signs or symptoms?  Symptoms of a drug overdose depend on the drug and the amount that was taken. Common danger signs include:  · Behavior changes.  · Sleepiness.  · Slowed breathing.  · Nausea and vomiting.  · Seizures.  · Changes in eye pupil size. The pupil may be very large or very small.  If there are signs and symptoms of very low blood pressure (shock) from an overdose, emergency treatment is required. These include:  · Cold and clammy skin.  · Pale skin.  · Blue lips.  · Very slow breathing.  · Extreme sleepiness.  · Loss of consciousness.  How is this diagnosed?  This condition may be diagnosed based on your symptoms. It is important to tell your health care provider:  · All of the drugs that you took.  · When you took the drugs.  · Whether you were drinking alcohol.  Your health care provider will do a physical exam. This exam may include:  · Checking and monitoring your heart rate and rhythm, your temperature, and your blood pressure (vital signs).  · Checking your breathing and oxygen level.  You may also have tests, including:  · Urine tests to check for drugs in your system.  · Blood tests to check for:  ¨ Drugs in your system.  ¨ Signs of an imbalance of your blood minerals (electrolytes).  ¨ Liver damage.  ¨ Kidney damage.  How is this treated?  Supporting your vital signs and your breathing is the first step in treating a drug overdose. Treatment may also include:  · Giving fluids and electrolytes through an IV tube.  · Inserting a breathing tube (endotracheal tube) in your airway to help you breathe.  · Passing a tube through your nose and into your stomach (NG tube, or nasogastric tube) to wash out your stomach.  · Giving medicines that:  ¨ Make you  vomit.  ¨ Absorb any medicine that is left in your digestive system.  ¨ Block or reverse the effect of the drug that caused the overdose.  · Filtering your blood through an artificial kidney machine (hemodialysis). You may need this if your overdose is severe or if you have kidney failure.  · Ongoing counseling and mental health support if you intentionally overdosed or used an illegal drug.  Follow these instructions at home:  · Take medicines only as directed by your health care provider. Always ask your health care provider about possible side effects of any new drug that you start taking.  · Keep a list of all of the drugs that you take, including over-the-counter medicines. Bring this list with you to all of your medical visits.  · Drink enough fluid to keep your urine clear or pale yellow.  · Keep all follow-up visits as directed by your health care provider. This is important.  How is this prevented?  · Get help if you are struggling with:  ¨ Alcohol or drug use.  ¨ Depression or another mental health problem.  · Keep the phone number of your local poison control center near your phone or on your cell phone.  · Store all medicines in safety containers that are out of the reach of children.  · Read the drug inserts that come with your medicines.  · Do not use illegal drugs.  · Do not drink alcohol when taking drugs.  · Do not take medicines that are not prescribed for you.  Contact a health care provider if:  · Your symptoms return.  · You develop new symptoms or side effects when you take medicines.  Get help right away if:  · You think that you or someone else may have taken too much of a drug. The hotline of the National Poison Control Center is (040) 849-4538.  · You or someone else is having symptoms of a drug overdose.  · You have serious thoughts about hurting yourself or others.  · You become confused.  · You have:  ¨ Chest pain.  ¨ Difficulty breathing.  ¨ A loss of consciousness.  Drug overdose is an  emergency. Do not wait to see if the symptoms will go away. Get medical help right away. Call your local emergency services (911 in the [place] Do not drive yourself to the hospital.   This information is not intended to replace advice given to you by your health care provider. Make sure you discuss any questions you have with your health care provider.  Document Released: 05/03/2016 Document Revised: 04/28/2017 Document Reviewed: 12/23/2015  [employer] Interactive Patient Education © 2017 [employer]      Depression / Suicide Risk    As you are discharged from this [facility] facility, it is important to learn how to keep safe from harming yourself.    Recognize the warning signs:  · Abrupt changes in personality, positive or negative- including increase in energy   · Giving away possessions  · Change in eating patterns- significant weight changes-  positive or negative  · Change in sleeping patterns- unable to sleep or sleeping all the time   · Unwillingness or inability to communicate  · Depression  · Unusual sadness, discouragement and loneliness  · Talk of wanting to die  · Neglect of personal appearance   · Rebelliousness- reckless behavior  · Withdrawal from people/activities they love  · Confusion- inability to concentrate     If you or a loved one observes any of these behaviors or has concerns about self-harm, here's what you can do:  · Talk about it- your feelings and reasons for harming yourself  · Remove any means that you might use to hurt yourself (examples: pills, rope, extension cords, firearm)  · Get professional help from the community (Mental Health, Substance Abuse, psychological counseling)  · Do not be alone:Call your Safe Contact- someone whom you trust who will be there for you.  · Call your local CRISIS HOTLINE 309-0051 or 444-655-7560  · Call your local Children's Mobile Crisis Response Team [place] (006) 590-7693 or www.[employer]  · Call the toll free National Suicide  Prevention Hotlines   · National Suicide Prevention Lifeline 964-197-SZPD (6090)  · National Hope Line Network 800-SUICIDE (538-9315)

## 2018-07-31 NOTE — DISCHARGE PLANNING
Anticipated Discharge Disposition: [facility]    Action: Pt was accepted by [facility]. LSW faxed transportation request to [facility]. LSW received transportation time of 1500 to [facility]. LSW called admissions with [facility] to update on discharge time. LSW informed bedside RN.      Barriers to Discharge: None    Plan: Pt to transport to [facility] at 1500 today (7/31/18) via [facility]. No further discharge needs at this time.

## 2018-07-31 NOTE — DISCHARGE PLANNING
Agency/Facility Name: [facility]  Spoke To: LESTER from [facility]  Outcome: Requesting legal hold and extension, faxed documents to .

## 2018-07-31 NOTE — DISCHARGE SUMMARY
Discharge Summary    CHIEF COMPLAINT ON ADMISSION  Chief Complaint   Patient presents with   • Drug Overdose       Reason for Admission  OD/SI     CODE STATUS  Full Code    HPI & HOSPITAL COURSE  37 y.o. female who presented 7/27/2018 with intentional drug overdose.  Patient states she took a handful of trazodone earlier today.  She reports that she was feeling depressed and has been drinking excessive alcohol for the past 3 days.  She decided to take a handful of trazodone.  She denies active suicidal ideation at the time of ER admit.    She was monitored on telemetry. She was placed on legal hold. She was continued on lexapro by psychiatry. She is medically cleared. She will be transferred to inpatient psych facility on 7/31. She was started on lisinopril for HTN.          Therefore, she is discharged in good and stable condition to a psychiatric hospital.    The patient recovered much more quickly than anticipated on admission.      FOLLOW UP ITEMS POST DISCHARGE      DISCHARGE DIAGNOSES  Active Problems:    Intentional drug overdose (HCC) POA: Yes    Alcohol abuse POA: Yes    Tobacco abuse POA: Yes    QT prolongation POA: Yes    Benign essential HTN POA: Yes  Resolved Problems:    Hypokalemia POA: Unknown      FOLLOW UP  No future appointments.  No follow-up provider specified.    MEDICATIONS ON DISCHARGE     Medication List      START taking these medications      Instructions   escitalopram 10 MG Tabs  Commonly known as:  LEXAPRO   Take 1 Tab by mouth every day.  Dose:  10 mg     lisinopril 20 MG Tabs  Commonly known as:  PRINIVIL   Take 1 Tab by mouth every day.  Dose:  20 mg            Allergies  No Known Allergies    DIET  Orders Placed This Encounter   Procedures   • Diet Order Regular (Plasticware only please)     Standing Status:   Standing     Number of Occurrences:   1     Order Specific Question:   Diet:     Answer:   Regular [1]     Comments:   Plasticware only please       ACTIVITY  As  tolerated.  Weight bearing as tolerated    LINES, DRAINS, AND WOUNDS  This is an automated list. Peripheral IVs will be removed prior to discharge.  PIV Group Left Antecubital 20g Saline Lock (Active)   Line Secured Taped;Transparent 7/31/2018  8:15 AM   Site Condition / Description Assessed;Patent;Clean;Dry;Intact 7/31/2018  8:15 AM   Dressing Type / Description Transparent;Clean;Dry;Intact 7/31/2018  8:15 AM   Dressing Status Observed 7/31/2018  8:15 AM   Saline Locked Yes 7/30/2018  7:44 PM   Infiltration Grading Used by [facility] 0 7/31/2018  8:15 AM   Phlebitis Scale (Used by [facility]) 0 7/31/2018  8:15 AM                     MENTAL STATUS ON TRANSFER  Level of Consciousness: Alert  Orientation : Oriented x 4  Speech: Speech Clear    CONSULTATIONS    psychiatry    PROCEDURES  none    LABORATORY  Lab Results   Component Value Date    SODIUM 136 07/30/2018    POTASSIUM 3.8 07/30/2018    CHLORIDE 106 07/30/2018    CO2 25 07/30/2018    GLUCOSE 93 07/30/2018    BUN 9 07/30/2018    CREATININE 0.60 07/30/2018    CREATININE 0.8 06/30/2005        Lab Results   Component Value Date    WBC 8.8 07/30/2018    HEMOGLOBIN 12.7 07/30/2018    HEMATOCRIT 38.3 07/30/2018    PLATELETCT 324 07/30/2018        Total time of the discharge process exceeds 37 minutes.

## 2018-07-31 NOTE — DISCHARGE PLANNING
Received Transport Form @ 0391  Spoke to Shemar BROWN    Transport is scheduled for 7/31 @1500 [address]  .

## 2018-07-31 NOTE — PROGRESS NOTES
[facility] Hospitalist Progress Note    Date of Service: 2018    Chief Complaint  37 y.o. female admitted 2018 with SA w/ trazodone OD     Interval Problem Update  : Seen by psych. Legal hold certified. DC . Repleting K.   : panic attack overnight. DC IVF. Repleting K. Added norco PRN  : QT resolved. Added lisinopril for HTN.    Disposition  Pending xfr to [facility] facility        Review of Systems   Constitutional: Negative for chills and fever.   Respiratory: Negative for cough and shortness of breath.    Cardiovascular: Negative for chest pain.   Gastrointestinal: Negative for constipation, diarrhea, nausea and vomiting.   Genitourinary: Negative for dysuria.   Musculoskeletal: Positive for back pain.   Neurological: Positive for dizziness. Negative for headaches.      Physical Exam  Laboratory/Imaging   Hemodynamics  Temp (24hrs), Av.1 °C (98.7 °F), Min:36.9 °C (98.4 °F), Max:37.2 °C (99 °F)   Temperature: 37 °C (98.6 °F)  Pulse  Av.9  Min: 65  Max: 103    Blood Pressure: 157/102      Respiratory      Respiration: 16, Pulse Oximetry: 97 %             Fluids    Intake/Output Summary (Last 24 hours) at 18 2147  Last data filed at 18 0800   Gross per 24 hour   Intake              240 ml   Output                0 ml   Net              240 ml       Nutrition  Orders Placed This Encounter   Procedures   • Diet Order Regular (Plasticware only please)     Standing Status:   Standing     Number of Occurrences:   1     Order Specific Question:   Diet:     Answer:   Regular [1]     Comments:   Plasticware only please     Physical Exam   Constitutional: She appears well-developed.   HENT:   Head: Normocephalic.   Eyes: Conjunctivae are normal.   Cardiovascular: Normal rate.  Exam reveals no gallop.    Pulmonary/Chest: No respiratory distress. She has no wheezes. She has no rales.   Abdominal: She exhibits no distension. There is no tenderness. There is no rebound.    Neurological: She is alert.       Recent Labs      07/29/18   0424  07/30/18   0251   WBC  7.5  8.8   RBC  3.88*  4.22   HEMOGLOBIN  11.5*  12.7   HEMATOCRIT  35.9*  38.3   MCV  92.5  90.8   MCH  29.6  30.1   MCHC  32.0*  33.2*   RDW  44.0  42.1   PLATELETCT  299  324   MPV  9.8  9.5     Recent Labs      07/29/18   0424  07/30/18   0251   SODIUM  139  136   POTASSIUM  3.4*  3.8   CHLORIDE  109  106   CO2  23  25   GLUCOSE  101*  93   BUN  10  9   CREATININE  0.68  0.60   CALCIUM  8.2*  9.1                      Assessment/Plan     Intentional drug overdose (HCC)- (present on admission)   Assessment & Plan    Patient took hand full of trazodone along with excessive alcohol  Psych consult  Legal hold  Suicide precautions        Benign essential HTN- (present on admission)   Assessment & Plan    Started on lisinopril        Hypokalemia   Assessment & Plan    Repleting as needed         QT prolongation- (present on admission)   Assessment & Plan    Likely secondary to trazodone toxicity  Patient has been given IV mag 2 g  Telemetry monitoring  Repeat EKG QTc back to 445        Tobacco abuse- (present on admission)   Assessment & Plan    Tobacco cessation education provided for more than 10 minutes, discussed options of nicotine patch, medical treatment with wellbutrin and chantix. Discussed the risks of smoking including increased risk of heart disease, stroke, cancer and COPD. Discussed the benefits of quitting smoking. Nicotine replacement protocol will be provided to the patient.          Alcohol abuse- (present on admission)   Assessment & Plan    Monitor for signs of alcohol withdrawal  Patient has been counseled extensively on alcohol cessation          Quality-Core Measures   Reviewed items::  Labs reviewed and Medications reviewed  Boykin catheter::  No Boykin  DVT prophylaxis pharmacological::  Heparin

## 2018-07-31 NOTE — PSYCHIATRY
"PSYCHIATRIC FOLLOW UP:    Reason for Admission: Trazodone OD  Legal hold status: Legal 2000  Psychiatric Supervising Attending: Dr. Ynes Wing    ID: 37 year old  female with history of depression and anxiety who was driven to the ED by her friend after she overdosed on a handful of trazodone pills.       Subjective:   Ms. Tucker says that she is feeling good today. She was able to talk to her family members and friends, and all have been supportive. Her daughter stayed by her side for hours. She says that her mother told her she had never opened up like this before in her life. She remains without SI. She had a panic attack two nights ago; has not used hydroxyzine yet, was educated on it and she says that she will likely try it next time. Has not had any issues with Lexapro.     Psychiatric Examination:   Vitals: Weight/BMI: Body mass index is 39.44 kg/m². Blood pressure 157/102, pulse 77, temperature 37 °C (98.6 °F), resp. rate 16, height 1.651 m (5' 5\"), weight 107.5 kg (236 lb 15.9 oz), SpO2 97 %, not currently breastfeeding.   Vitals:    07/30/18 0726 07/30/18 1215 07/30/18 1612 07/30/18 2000   BP: 146/105 152/106 148/105 157/102   Pulse: 78 72 66 77   Resp: 16 16 16 16   Temp: 36.9 °C (98.4 °F) 37.1 °C (98.8 °F) 37.1 °C (98.8 °F) 37 °C (98.6 °F)   SpO2: 96% 97% 96% 97%   Weight:       Height:         Appearance: Overweight  female with short blue highlighted hair dressed in hospital gown  Behavior: Open, cooperative  Thought Content: No SI/HI, no AVH, no delusions  Thought Process: Linear and goal-oriented  Speech: Normal rate and rhythm  Mood: \"\"Good\"  Affect: Bright  Attention/Alertness: Attends well  Orientation/Memory: Oriented   Insight/Judgement:  Fair/fair      Medical Systems Reviewed:  All negative      Lab Results/Imaging:  Reviewed.      ASSESSMENT:   1. Adjustment disorder with disturbance of mood  2. Rule out alcohol-related mood disorder  3. Rule out major " depressive disorder with SI  4. Unspecified anxiety disorder  5. Methamphetamine use disorder (recently in remission by patient's report)     This is a 37 year old  female with history of depression and anxiety who was admitted after intentional overdose; she has had major stressors in her life and has not seen a psychiatrist or any doctor in years; she would benefit from inpatient psychiatry and she is agreeable to this.     PLAN:  Legal status: extended     - Continue Lexapro 10 mg for depressive symptoms  - Decrease hydroxyzine to 25 mg PRN anxiety as she says she is sensitive        Psychiatry will follow.  Thank you for the consult.

## 2018-07-31 NOTE — PROGRESS NOTES
Assumed care of pt, fall precautions in place, bed locked in lowest position. Answered all questions at this time, Instructed pt to call before getting out of bed. Sitter at bedside, pt is on a legal hold.

## 2018-07-31 NOTE — CARE PLAN
Problem: Safety  Goal: Will remain free from falls  Outcome: PROGRESSING AS EXPECTED  Pt teaching given regarding safety and fall precautions; pt verbalized understanding; bed in lowest position; treaded socks on; pt AAOx4; gait steady, no assistance needed; no complaints of dizziness. Pt aware to use call light for assistance as needed. Call light within reach. 1:1 sitter present.      Problem: Knowledge Deficit  Goal: Knowledge of disease process/condition, treatment plan, diagnostic tests, and medications will improve  Outcome: PROGRESSING AS EXPECTED  Pt teaching given about medications, activity, unit routine, plan of care discussed.      Problem: Pain Management  Goal: Pain level will decrease to patient's comfort goal  Outcome: PROGRESSING AS EXPECTED
